# Patient Record
Sex: MALE | Race: WHITE | NOT HISPANIC OR LATINO | Employment: FULL TIME | URBAN - METROPOLITAN AREA
[De-identification: names, ages, dates, MRNs, and addresses within clinical notes are randomized per-mention and may not be internally consistent; named-entity substitution may affect disease eponyms.]

---

## 2018-03-31 ENCOUNTER — HOSPITAL ENCOUNTER (INPATIENT)
Facility: HOSPITAL | Age: 33
LOS: 1 days | Discharge: HOME | DRG: 343 | End: 2018-04-01
Attending: EMERGENCY MEDICINE | Admitting: SURGERY
Payer: COMMERCIAL

## 2018-03-31 DIAGNOSIS — K35.80 ACUTE APPENDICITIS: Primary | ICD-10-CM

## 2018-03-31 LAB
BASOPHILS # BLD MANUAL: 0 THOUSAND/UL (ref 0–0.1)
BASOPHILS NFR MAR MANUAL: 0 % (ref 0–1)
EOSINOPHIL # BLD MANUAL: 0 THOUSAND/UL (ref 0–0.4)
EOSINOPHIL NFR BLD MANUAL: 0 % (ref 0–6)
ERYTHROCYTE [DISTWIDTH] IN BLOOD BY AUTOMATED COUNT: 12.7 % (ref 11.6–15.1)
HCT VFR BLD AUTO: 44.6 % (ref 36.5–49.3)
HGB BLD-MCNC: 16.3 G/DL (ref 12–17)
LYMPHOCYTES # BLD AUTO: 0.8 THOUSAND/UL (ref 0.6–4.47)
LYMPHOCYTES # BLD AUTO: 5 % (ref 14–44)
MCH RBC QN AUTO: 31.7 PG (ref 26.8–34.3)
MCHC RBC AUTO-ENTMCNC: 36.5 G/DL (ref 31.4–37.4)
MCV RBC AUTO: 87 FL (ref 82–98)
MONOCYTES # BLD AUTO: 0.64 THOUSAND/UL (ref 0–1.22)
MONOCYTES NFR BLD: 4 % (ref 4–12)
NEUTROPHILS # BLD MANUAL: 14.6 THOUSAND/UL (ref 1.85–7.62)
NEUTS BAND NFR BLD MANUAL: 5 % (ref 0–8)
NEUTS SEG NFR BLD AUTO: 86 % (ref 43–75)
PLATELET # BLD AUTO: 183 THOUSANDS/UL (ref 149–390)
PLATELET BLD QL SMEAR: ADEQUATE
PMV BLD AUTO: 12.1 FL (ref 8.9–12.7)
RBC # BLD AUTO: 5.15 MILLION/UL (ref 3.88–5.62)
RBC MORPH BLD: NORMAL
TOTAL CELLS COUNTED SPEC: 100
WBC # BLD AUTO: 16.04 THOUSAND/UL (ref 4.31–10.16)

## 2018-03-31 PROCEDURE — 96375 TX/PRO/DX INJ NEW DRUG ADDON: CPT

## 2018-03-31 PROCEDURE — 80053 COMPREHEN METABOLIC PANEL: CPT | Performed by: EMERGENCY MEDICINE

## 2018-03-31 PROCEDURE — 96374 THER/PROPH/DIAG INJ IV PUSH: CPT

## 2018-03-31 PROCEDURE — 96361 HYDRATE IV INFUSION ADD-ON: CPT

## 2018-03-31 PROCEDURE — 36415 COLL VENOUS BLD VENIPUNCTURE: CPT | Performed by: EMERGENCY MEDICINE

## 2018-03-31 PROCEDURE — 85027 COMPLETE CBC AUTOMATED: CPT | Performed by: EMERGENCY MEDICINE

## 2018-03-31 PROCEDURE — 83690 ASSAY OF LIPASE: CPT | Performed by: EMERGENCY MEDICINE

## 2018-03-31 PROCEDURE — 85007 BL SMEAR W/DIFF WBC COUNT: CPT | Performed by: EMERGENCY MEDICINE

## 2018-03-31 RX ORDER — KETOROLAC TROMETHAMINE 30 MG/ML
30 INJECTION, SOLUTION INTRAMUSCULAR; INTRAVENOUS ONCE
Status: COMPLETED | OUTPATIENT
Start: 2018-03-31 | End: 2018-03-31

## 2018-03-31 RX ORDER — ONDANSETRON 2 MG/ML
4 INJECTION INTRAMUSCULAR; INTRAVENOUS ONCE
Status: COMPLETED | OUTPATIENT
Start: 2018-03-31 | End: 2018-03-31

## 2018-03-31 RX ADMIN — KETOROLAC TROMETHAMINE 30 MG: 30 INJECTION, SOLUTION INTRAMUSCULAR; INTRAVENOUS at 23:29

## 2018-03-31 RX ADMIN — SODIUM CHLORIDE 1000 ML: 0.9 INJECTION, SOLUTION INTRAVENOUS at 23:26

## 2018-03-31 RX ADMIN — ONDANSETRON HYDROCHLORIDE 4 MG: 2 INJECTION, SOLUTION INTRAMUSCULAR; INTRAVENOUS at 23:26

## 2018-04-01 ENCOUNTER — ANESTHESIA EVENT (INPATIENT)
Dept: PERIOP | Facility: HOSPITAL | Age: 33
DRG: 343 | End: 2018-04-01
Payer: COMMERCIAL

## 2018-04-01 ENCOUNTER — ANESTHESIA (INPATIENT)
Dept: PERIOP | Facility: HOSPITAL | Age: 33
DRG: 343 | End: 2018-04-01
Payer: COMMERCIAL

## 2018-04-01 ENCOUNTER — APPOINTMENT (EMERGENCY)
Dept: CT IMAGING | Facility: HOSPITAL | Age: 33
DRG: 343 | End: 2018-04-01
Payer: COMMERCIAL

## 2018-04-01 VITALS
HEART RATE: 87 BPM | DIASTOLIC BLOOD PRESSURE: 71 MMHG | RESPIRATION RATE: 18 BRPM | SYSTOLIC BLOOD PRESSURE: 133 MMHG | WEIGHT: 174.6 LBS | BODY MASS INDEX: 21.71 KG/M2 | OXYGEN SATURATION: 93 % | HEIGHT: 75 IN | TEMPERATURE: 98.4 F

## 2018-04-01 PROBLEM — K35.80 ACUTE APPENDICITIS: Status: ACTIVE | Noted: 2018-03-31

## 2018-04-01 PROBLEM — K37 APPENDICITIS: Status: ACTIVE | Noted: 2018-04-01

## 2018-04-01 LAB
ALBUMIN SERPL BCP-MCNC: 4.8 G/DL (ref 3.5–5)
ALP SERPL-CCNC: 75 U/L (ref 46–116)
ALT SERPL W P-5'-P-CCNC: 28 U/L (ref 12–78)
ANION GAP SERPL CALCULATED.3IONS-SCNC: 12 MMOL/L (ref 4–13)
AST SERPL W P-5'-P-CCNC: 10 U/L (ref 5–45)
BILIRUB SERPL-MCNC: 0.8 MG/DL (ref 0.2–1)
BUN SERPL-MCNC: 13 MG/DL (ref 5–25)
CALCIUM SERPL-MCNC: 9.4 MG/DL (ref 8.3–10.1)
CHLORIDE SERPL-SCNC: 101 MMOL/L (ref 100–108)
CO2 SERPL-SCNC: 28 MMOL/L (ref 21–32)
CREAT SERPL-MCNC: 0.89 MG/DL (ref 0.6–1.3)
GFR SERPL CREATININE-BSD FRML MDRD: 112 ML/MIN/1.73SQ M
GLUCOSE SERPL-MCNC: 131 MG/DL (ref 65–140)
LIPASE SERPL-CCNC: 139 U/L (ref 73–393)
POTASSIUM SERPL-SCNC: 3 MMOL/L (ref 3.5–5.3)
PROT SERPL-MCNC: 8.6 G/DL (ref 6.4–8.2)
SODIUM SERPL-SCNC: 141 MMOL/L (ref 136–145)

## 2018-04-01 PROCEDURE — 44970 LAPAROSCOPY APPENDECTOMY: CPT | Performed by: PHYSICIAN ASSISTANT

## 2018-04-01 PROCEDURE — 88304 TISSUE EXAM BY PATHOLOGIST: CPT | Performed by: PATHOLOGY

## 2018-04-01 PROCEDURE — 96365 THER/PROPH/DIAG IV INF INIT: CPT

## 2018-04-01 PROCEDURE — 44970 LAPAROSCOPY APPENDECTOMY: CPT | Performed by: SURGERY

## 2018-04-01 PROCEDURE — 99222 1ST HOSP IP/OBS MODERATE 55: CPT | Performed by: SURGERY

## 2018-04-01 PROCEDURE — 99285 EMERGENCY DEPT VISIT HI MDM: CPT

## 2018-04-01 PROCEDURE — 74177 CT ABD & PELVIS W/CONTRAST: CPT

## 2018-04-01 PROCEDURE — 0DTJ4ZZ RESECTION OF APPENDIX, PERCUTANEOUS ENDOSCOPIC APPROACH: ICD-10-PCS | Performed by: SURGERY

## 2018-04-01 RX ORDER — ROCURONIUM BROMIDE 10 MG/ML
INJECTION, SOLUTION INTRAVENOUS AS NEEDED
Status: DISCONTINUED | OUTPATIENT
Start: 2018-04-01 | End: 2018-04-01 | Stop reason: SURG

## 2018-04-01 RX ORDER — ROCURONIUM BROMIDE 10 MG/ML
INJECTION, SOLUTION INTRAVENOUS AS NEEDED
Status: DISCONTINUED | OUTPATIENT
Start: 2018-04-01 | End: 2018-04-01

## 2018-04-01 RX ORDER — LEVOFLOXACIN 5 MG/ML
500 INJECTION, SOLUTION INTRAVENOUS EVERY 24 HOURS
Status: DISCONTINUED | OUTPATIENT
Start: 2018-04-01 | End: 2018-04-01 | Stop reason: HOSPADM

## 2018-04-01 RX ORDER — FENTANYL CITRATE 50 UG/ML
INJECTION, SOLUTION INTRAMUSCULAR; INTRAVENOUS AS NEEDED
Status: DISCONTINUED | OUTPATIENT
Start: 2018-04-01 | End: 2018-04-01 | Stop reason: SURG

## 2018-04-01 RX ORDER — ACETAMINOPHEN 325 MG/1
650 TABLET ORAL EVERY 6 HOURS PRN
Status: DISCONTINUED | OUTPATIENT
Start: 2018-04-01 | End: 2018-04-01 | Stop reason: HOSPADM

## 2018-04-01 RX ORDER — ONDANSETRON 2 MG/ML
4 INJECTION INTRAMUSCULAR; INTRAVENOUS EVERY 4 HOURS PRN
Status: DISCONTINUED | OUTPATIENT
Start: 2018-04-01 | End: 2018-04-01 | Stop reason: HOSPADM

## 2018-04-01 RX ORDER — ONDANSETRON 4 MG/1
4 TABLET, ORALLY DISINTEGRATING ORAL EVERY 4 HOURS PRN
Status: DISCONTINUED | OUTPATIENT
Start: 2018-04-01 | End: 2018-04-01 | Stop reason: HOSPADM

## 2018-04-01 RX ORDER — FENTANYL CITRATE/PF 50 MCG/ML
25 SYRINGE (ML) INJECTION
Status: DISCONTINUED | OUTPATIENT
Start: 2018-04-01 | End: 2018-04-01 | Stop reason: HOSPADM

## 2018-04-01 RX ORDER — ONDANSETRON 2 MG/ML
4 INJECTION INTRAMUSCULAR; INTRAVENOUS ONCE AS NEEDED
Status: COMPLETED | OUTPATIENT
Start: 2018-04-01 | End: 2018-04-01

## 2018-04-01 RX ORDER — POTASSIUM CHLORIDE 14.9 MG/ML
20 INJECTION INTRAVENOUS ONCE
Status: COMPLETED | OUTPATIENT
Start: 2018-04-01 | End: 2018-04-01

## 2018-04-01 RX ORDER — MIDAZOLAM HYDROCHLORIDE 1 MG/ML
INJECTION INTRAMUSCULAR; INTRAVENOUS AS NEEDED
Status: DISCONTINUED | OUTPATIENT
Start: 2018-04-01 | End: 2018-04-01 | Stop reason: SURG

## 2018-04-01 RX ORDER — HYDROCODONE BITARTRATE AND ACETAMINOPHEN 5; 325 MG/1; MG/1
1 TABLET ORAL EVERY 4 HOURS PRN
Status: DISCONTINUED | OUTPATIENT
Start: 2018-04-01 | End: 2018-04-01 | Stop reason: HOSPADM

## 2018-04-01 RX ORDER — FENTANYL CITRATE 50 UG/ML
INJECTION, SOLUTION INTRAMUSCULAR; INTRAVENOUS AS NEEDED
Status: DISCONTINUED | OUTPATIENT
Start: 2018-04-01 | End: 2018-04-01

## 2018-04-01 RX ORDER — DEXTROSE AND SODIUM CHLORIDE 5; .45 G/100ML; G/100ML
80 INJECTION, SOLUTION INTRAVENOUS CONTINUOUS
Status: DISCONTINUED | OUTPATIENT
Start: 2018-04-01 | End: 2018-04-01 | Stop reason: HOSPADM

## 2018-04-01 RX ORDER — SODIUM CHLORIDE 9 MG/ML
125 INJECTION, SOLUTION INTRAVENOUS CONTINUOUS
Status: DISCONTINUED | OUTPATIENT
Start: 2018-04-01 | End: 2018-04-01 | Stop reason: HOSPADM

## 2018-04-01 RX ORDER — HYDROCODONE BITARTRATE AND ACETAMINOPHEN 5; 325 MG/1; MG/1
1-2 TABLET ORAL EVERY 6 HOURS PRN
Qty: 30 TABLET | Refills: 0 | Status: SHIPPED | OUTPATIENT
Start: 2018-04-01 | End: 2019-08-07 | Stop reason: ALTCHOICE

## 2018-04-01 RX ORDER — MORPHINE SULFATE 2 MG/ML
2 INJECTION, SOLUTION INTRAMUSCULAR; INTRAVENOUS EVERY 2 HOUR PRN
Status: DISCONTINUED | OUTPATIENT
Start: 2018-04-01 | End: 2018-04-01 | Stop reason: HOSPADM

## 2018-04-01 RX ORDER — SUCCINYLCHOLINE CHLORIDE 20 MG/ML
INJECTION INTRAMUSCULAR; INTRAVENOUS AS NEEDED
Status: DISCONTINUED | OUTPATIENT
Start: 2018-04-01 | End: 2018-04-01 | Stop reason: SURG

## 2018-04-01 RX ORDER — CLINDAMYCIN PHOSPHATE 600 MG/50ML
600 INJECTION INTRAVENOUS ONCE
Status: COMPLETED | OUTPATIENT
Start: 2018-04-01 | End: 2018-04-01

## 2018-04-01 RX ORDER — GLYCOPYRROLATE 0.2 MG/ML
INJECTION INTRAMUSCULAR; INTRAVENOUS AS NEEDED
Status: DISCONTINUED | OUTPATIENT
Start: 2018-04-01 | End: 2018-04-01 | Stop reason: SURG

## 2018-04-01 RX ORDER — MORPHINE SULFATE 4 MG/ML
4 INJECTION, SOLUTION INTRAMUSCULAR; INTRAVENOUS ONCE AS NEEDED
Status: COMPLETED | OUTPATIENT
Start: 2018-04-01 | End: 2018-04-01

## 2018-04-01 RX ORDER — PROPOFOL 10 MG/ML
INJECTION, EMULSION INTRAVENOUS AS NEEDED
Status: DISCONTINUED | OUTPATIENT
Start: 2018-04-01 | End: 2018-04-01 | Stop reason: SURG

## 2018-04-01 RX ADMIN — METRONIDAZOLE 500 MG: 500 INJECTION, SOLUTION INTRAVENOUS at 02:34

## 2018-04-01 RX ADMIN — HYDROCODONE BITARTRATE AND ACETAMINOPHEN 1 TABLET: 5; 325 TABLET ORAL at 14:03

## 2018-04-01 RX ADMIN — MORPHINE SULFATE 4 MG: 4 INJECTION, SOLUTION INTRAMUSCULAR; INTRAVENOUS at 02:19

## 2018-04-01 RX ADMIN — DEXAMETHASONE SODIUM PHOSPHATE 4 MG: 10 INJECTION INTRAMUSCULAR; INTRAVENOUS at 07:50

## 2018-04-01 RX ADMIN — NEOSTIGMINE METHYLSULFATE 3 MG: 1 INJECTION, SOLUTION INTRAMUSCULAR; INTRAVENOUS; SUBCUTANEOUS at 08:05

## 2018-04-01 RX ADMIN — MIDAZOLAM HYDROCHLORIDE 2 MG: 1 INJECTION, SOLUTION INTRAMUSCULAR; INTRAVENOUS at 07:26

## 2018-04-01 RX ADMIN — CLINDAMYCIN PHOSPHATE 600 MG: 12 INJECTION, SOLUTION INTRAMUSCULAR; INTRAVENOUS at 02:05

## 2018-04-01 RX ADMIN — SODIUM CHLORIDE: 0.9 INJECTION, SOLUTION INTRAVENOUS at 07:20

## 2018-04-01 RX ADMIN — ROCURONIUM BROMIDE 5 MG: 10 INJECTION INTRAVENOUS at 07:29

## 2018-04-01 RX ADMIN — SODIUM CHLORIDE 125 ML/HR: 0.9 INJECTION, SOLUTION INTRAVENOUS at 02:04

## 2018-04-01 RX ADMIN — DEXTROSE AND SODIUM CHLORIDE 80 ML/HR: 5; 450 INJECTION, SOLUTION INTRAVENOUS at 09:45

## 2018-04-01 RX ADMIN — SODIUM CHLORIDE: 0.9 INJECTION, SOLUTION INTRAVENOUS at 08:00

## 2018-04-01 RX ADMIN — Medication 500 MG: at 07:38

## 2018-04-01 RX ADMIN — ONDANSETRON 4 MG: 2 INJECTION INTRAMUSCULAR; INTRAVENOUS at 07:50

## 2018-04-01 RX ADMIN — PROPOFOL 180 MG: 10 INJECTION, EMULSION INTRAVENOUS at 07:30

## 2018-04-01 RX ADMIN — GLYCOPYRROLATE 0.4 MG: 0.2 INJECTION, SOLUTION INTRAMUSCULAR; INTRAVENOUS at 08:05

## 2018-04-01 RX ADMIN — ROCURONIUM BROMIDE 25 MG: 10 INJECTION INTRAVENOUS at 07:36

## 2018-04-01 RX ADMIN — IOHEXOL 90 ML: 350 INJECTION, SOLUTION INTRAVENOUS at 00:50

## 2018-04-01 RX ADMIN — POTASSIUM CHLORIDE 20 MEQ: 200 INJECTION, SOLUTION INTRAVENOUS at 00:50

## 2018-04-01 RX ADMIN — FENTANYL CITRATE 100 MCG: 50 INJECTION, SOLUTION INTRAMUSCULAR; INTRAVENOUS at 07:29

## 2018-04-01 RX ADMIN — SUCCINYLCHOLINE CHLORIDE 100 MG: 20 INJECTION, SOLUTION INTRAMUSCULAR; INTRAVENOUS; PARENTERAL at 07:30

## 2018-04-01 NOTE — ED PROVIDER NOTES
History  Chief Complaint   Patient presents with    Abdominal Pain     Presents with C/O mid abd pain and N/V x 4 hours  No fever, no diarrhea, ate "[place] food" yesterday  Vomiting x1  Last meal approx 1630  Patient complains of intense abdominal pain constant for about 7 hours now  Ate [place] food last night, felt ill today but severe pain around 5 pm   Vomited once and did not feel better afterwards  Associated nausea and chills  No diarrhea or constipation  Took Tums without relief  History provided by:  Patient  Abdominal Pain   Pain location:  Periumbilical  Pain radiates to:  Does not radiate  Pain severity:  Severe  Onset quality:  Gradual  Timing:  Constant  Progression:  Worsening  Chronicity:  New  Associated symptoms: chills, nausea and vomiting    Associated symptoms: no chest pain, no constipation, no diarrhea, no dysuria, no fever, no shortness of breath and no sore throat        None       History reviewed  No pertinent past medical history  History reviewed  No pertinent surgical history  History reviewed  No pertinent family history  I have reviewed and agree with the history as documented  Social History   Substance Use Topics    Smoking status: Never Smoker    Smokeless tobacco: Never Used    Alcohol use No        Review of Systems   Constitutional: Positive for chills  Negative for fever  HENT: Negative for rhinorrhea and sore throat  Respiratory: Negative for shortness of breath  Cardiovascular: Negative for chest pain  Gastrointestinal: Positive for abdominal pain, nausea and vomiting  Negative for constipation and diarrhea  Genitourinary: Negative for dysuria and frequency  Skin: Negative for rash  All other systems reviewed and are negative        Physical Exam  ED Triage Vitals [03/31/18 2306]   Temperature Pulse Respirations Blood Pressure SpO2   98 °F (36 7 °C) 62 20 129/66 100 %      Temp Source Heart Rate Source Patient Position - Orthostatic VS BP Location FiO2 (%)   Temporal Monitor Lying Right arm --      Pain Score       7           Orthostatic Vital Signs  Vitals:    04/01/18 0200 04/01/18 0215 04/01/18 0230 04/01/18 0315   BP: 146/78  143/81 125/68   Pulse:  80 87 93   Patient Position - Orthostatic VS: Lying  Lying Lying       Physical Exam   Constitutional: He is oriented to person, place, and time  He appears well-developed and well-nourished  HENT:   Mouth/Throat: Oropharynx is clear and moist    Eyes: Conjunctivae and EOM are normal  Pupils are equal, round, and reactive to light  Neck: Normal range of motion  Neck supple  No spinous process tenderness present  Cardiovascular: Normal rate, regular rhythm, normal heart sounds and intact distal pulses  Pulmonary/Chest: Effort normal and breath sounds normal  No respiratory distress  He has no wheezes  Abdominal: Soft  Bowel sounds are normal  He exhibits no distension  There is tenderness in the epigastric area  There is no rebound and no guarding  Musculoskeletal: Normal range of motion  Neurological: He is alert and oriented to person, place, and time  He has normal strength  No sensory deficit  GCS eye subscore is 4  GCS verbal subscore is 5  GCS motor subscore is 6  Skin: Skin is warm and dry  No rash noted  Psychiatric: He has a normal mood and affect  Nursing note and vitals reviewed        ED Medications  Medications   sodium chloride 0 9 % infusion (125 mL/hr Intravenous Continue to Inpatient Floor 4/1/18 0219)   ondansetron [facility]) injection 4 mg (not administered)   ketorolac (TORADOL) injection 30 mg (30 mg Intravenous Given 3/31/18 2329)   ondansetron (ZOFRAN) injection 4 mg (4 mg Intravenous Given 3/31/18 2326)   sodium chloride 0 9 % bolus 1,000 mL (0 mL Intravenous Stopped 4/1/18 0022)   potassium chloride 20 mEq IVPB (premix) (0 mEq Intravenous Stopped 4/1/18 0230)   iohexol (OMNIPAQUE) 350 MG/ML injection (SINGLE-DOSE) 90 mL (90 mL Intravenous Given 4/1/18 0050)   morphine (PF) 4 mg/mL injection 4 mg (4 mg Intravenous Given 4/1/18 0219)   clindamycin (CLEOCIN) IVPB (premix) 600 mg (0 mg Intravenous Stopped 4/1/18 0225)   metroNIDAZOLE (FLAGYL) IVPB (premix) 500 mg (0 mg Intravenous Stopped 4/1/18 0332)       Diagnostic Studies  Results Reviewed     Procedure Component Value Units Date/Time    CMP [[id number]]  (Abnormal) Collected:  03/31/18 2310    Lab Status:  Final result Specimen:  Blood from Line, Venous Updated:  04/01/18 0005     Sodium 141 mmol/L      Potassium 3 0 (L) mmol/L      Chloride 101 mmol/L      CO2 28 mmol/L      Anion Gap 12 mmol/L      BUN 13 mg/dL      Creatinine 0 89 mg/dL      Glucose 131 mg/dL      Calcium 9 4 mg/dL      AST 10 U/L      ALT 28 U/L      Alkaline Phosphatase 75 U/L      Total Protein 8 6 (H) g/dL      Albumin 4 8 g/dL      Total Bilirubin 0 80 mg/dL      eGFR 112 ml/min/1 73sq m     Narrative:         National Kidney Disease Education Program recommendations are as follows:  GFR calculation is accurate only with a steady state creatinine  Chronic Kidney disease less than 60 ml/min/1 73 sq  meters  Kidney failure less than 15 ml/min/1 73 sq  meters  Lipase [[id number]]  (Normal) Collected:  03/31/18 2310    Lab Status:  Final result Specimen:  Blood from Line, Venous Updated:  04/01/18 0005     Lipase 139 u/L     CBC and differential [[id number]]  (Abnormal) Collected:  03/31/18 2310    Lab Status:  Final result Specimen:  Blood from Line, Venous Updated:  03/31/18 2358     WBC 16 04 (H) Thousand/uL      RBC 5 15 Million/uL      Hemoglobin 16 3 g/dL      Hematocrit 44 6 %      MCV 87 fL      MCH 31 7 pg      MCHC 36 5 g/dL      RDW 12 7 %      MPV 12 1 fL      Platelets 912 Thousands/uL     Narrative: This is an appended report  These results have been appended to a previously verified report                   CT abdomen pelvis with contrast   ED Interpretation by Aguilar Henderson DO (04/01 0122)   Acute appendicitis read by virtual radiology 2120  Also, L5 pars defect reviewed with patient  Procedures  CriticalCare Time  Performed by: Lance Ewing  Authorized by: Lance Ewing     Critical care provider statement:     Critical care time (minutes):  30    Critical care time was exclusive of:  Separately billable procedures and treating other patients and teaching time    Critical care was time spent personally by me on the following activities:  Obtaining history from patient or surrogate, development of treatment plan with patient or surrogate, discussions with consultants, evaluation of patient's response to treatment, examination of patient, review of old charts, re-evaluation of patient's condition, ordering and review of radiographic studies, ordering and review of laboratory studies and ordering and performing treatments and interventions           Phone Contacts  ED Phone Contact    ED Course  ED Course as of Apr 01 0506   Sun Apr 01, 2018   0124 Paged Dr Carlene Agarwal                                MDM  Number of Diagnoses or Management Options  Acute appendicitis: new and requires workup     Amount and/or Complexity of Data Reviewed  Clinical lab tests: ordered and reviewed  Tests in the radiology section of CPT®: ordered and reviewed  Discuss the patient with other providers: yes    Patient Progress  Patient progress: improved    The patient presented with a condition in which there was a high probability of imminent or life-threatening deterioration, and critical care services (excluding separately billable procedures) totalled 30-74 minutes          Disposition  Final diagnoses:   Acute appendicitis     Time reflects when diagnosis was documented in both MDM as applicable and the Disposition within this note     Time User Action Codes Description Comment    4/1/2018  1:33 AM Vic Bernal Add [K33 80] Acute appendicitis       ED Disposition     ED Disposition Condition Comment    Admit  Case was discussed with **Zachery* and the patient's admission status was agreed to be Admission Status: inpatient status to the service of Dr Nicole Robert   Follow-up Information    None       There are no discharge medications for this patient  No discharge procedures on file      ED Provider  Electronically Signed by           Chloe Najjar,   04/01/18 Harleen 115, DO  04/01/18 7322

## 2018-04-01 NOTE — CASE MANAGEMENT
Initial Clinical Review    Admission: Date/Time/Statement: 4/1/18 @ 0134     Orders Placed This Encounter   Procedures    Inpatient Admission (expected length of stay for this patient is greater than two midnights)     Standing Status:   Standing     Number of Occurrences:   1     Order Specific Question:   Admitting Physician     Answer:   Indra Villa [[id number]]     Order Specific Question:   Level of Care     Answer:   Med Surg [[id number]]     Order Specific Question:   Estimated length of stay     Answer:   More than 2 Midnights     Order Specific Question:   Certification     Answer:   I certify that inpatient services are medically necessary for this patient for a duration of greater than two midnights  See H&P and MD Progress Notes for additional information about the patient's course of treatment  ED: Date/Time/Mode of Arrival:   ED Arrival Information     Expected Arrival Acuity Means of Arrival Escorted By Service Admission Type    - 3/31/2018 22:51 Urgent Walk-In Spouse Surgery-General Urgent    Arrival Complaint    ABDOMINAL PAIN          Chief Complaint:   Chief Complaint   Patient presents with    Abdominal Pain     Presents with C/O mid abd pain and N/V x 4 hours  No fever, no diarrhea, ate "[place] food" yesterday  Vomiting x1  Last meal approx 1630  History of Illness:    Frankie Thayer is a 35 y o  male who presents with many hours 4 hours of abdominal pain prior to admission with some vodka vomiting  No diarrhea  A [place] food the night before  No constipation  No bloody stools  Denies fevers or chills  CT scan shows mild acute appendicitis     He works as a labor        ED Vital Signs:   ED Triage Vitals [03/31/18 2306]   Temperature Pulse Respirations Blood Pressure SpO2   98 °F (36 7 °C) 62 20 129/66 100 %      Temp Source Heart Rate Source Patient Position - Orthostatic VS BP Location FiO2 (%)   Temporal Monitor Lying Right arm --      Pain Score       7        Wt Readings from Last 1 Encounters:   04/01/18 79 2 kg (174 lb 9 7 oz)       Vital Signs (abnormal):    above    Abnormal Labs/Diagnostic Test Results:    K  3 0  WBC   16 04  Ct  Abd/pelvis:     Findings concerning for uncomplicated acute appendicitis  2   Left basilar 5 mm pulmonary nodule  Based on current Fleischner Society 2017 Guidelines on incidental pulmonary nodule, no routine follow-up is needed if the patient is considered low risk for lung cancer   If the patient is considered high risk for   lung cancer, 12 month follow-up non-contrast chest CT is recommended      ED Treatment:   Medication Administration from 03/31/2018 2251 to 04/01/2018 0301       Date/Time Order Dose Route Action Action by Comments     03/31/2018 2329 ketorolac (TORADOL) injection 30 mg 30 mg Intravenous Given Mariza Rucker RN      03/31/2018 2326 ondansetron (ZOFRAN) injection 4 mg 4 mg Intravenous Given Mariza Rucker RN      04/01/2018 0022 sodium chloride 0 9 % bolus 1,000 mL 0 mL Intravenous Stopped Ambrose Da Silva RN      03/31/2018 2326 sodium chloride 0 9 % bolus 1,000 mL 1,000 mL Intravenous New Bag Mariza Rucker RN      04/01/2018 0230 potassium chloride 20 mEq IVPB (premix) 0 mEq Intravenous Stopped Mariza Rucker RN      04/01/2018 0050 potassium chloride 20 mEq IVPB (premix) 20 mEq Intravenous [address] Ambrose Da Silva RN      04/01/2018 0050 iohexol (OMNIPAQUE) 350 MG/ML injection (SINGLE-DOSE) 90 mL 90 mL Intravenous Given Beatriz Rose      04/01/2018 0219 sodium chloride 0 9 % infusion 125 mL/hr Intravenous Continue to Inpatient Floor Mariza Rucker RN 1 liter     04/01/2018 0204 sodium chloride 0 9 % infusion 125 mL/hr Intravenous New Bag Mariza Rucker RN      04/01/2018 2691 morphine (PF) 4 mg/mL injection 4 mg 4 mg Intravenous Given Mariza Rucker RN      04/01/2018 0225 clindamycin (CLEOCIN) IVPB (premix) 600 mg 0 mg Intravenous Stopped Mariza Rucker RN      04/01/2018 0205 clindamycin (CLEOCIN) IVPB (premix) 600 mg 600 mg Intravenous New Bag Alba Lujan RN      04/01/2018 0235 metroNIDAZOLE (FLAGYL) IVPB (premix) 500 mg 500 mg Intravenous Continue to Inpatient Floor Alba Lujan RN      04/01/2018 0234 metroNIDAZOLE (FLAGYL) IVPB (premix) 500 mg 500 mg Intravenous New Bag Alba Lujan RN           Past Medical/Surgical History: Active Ambulatory Problems     Diagnosis Date Noted    No Active Ambulatory Problems     Resolved Ambulatory Problems     Diagnosis Date Noted    No Resolved Ambulatory Problems     No Additional Past Medical History       Admitting Diagnosis: Abdominal pain [R10 9]  Acute appendicitis [K35 80]    Age/Sex: 35 y o  male    Assessment/Plan:   Acute appendicitis  For laparoscopic appendectomy possible open this morning  Informed consent reviewed with patient, wife and mother      OR   4/1  Operative Findings:  Acute suppurative appendicitis  Bulge at the base of the appendix , so tiny bit of cecum was taken with the appendix      Post operative diagnosis:   Post-Op Diagnosis Codes:     * Acute appendicitis [K35 80]     Procedure:   Procedure(s):  APPENDECTOMY LAPAROSCOPIC       Admission Orders:    IP      4/1  @   0135  Scheduled Meds:   Current Facility-Administered Medications:  acetaminophen 650 mg Oral Q6H PRN Dean Scott MD    dextrose 5 % and sodium chloride 0 45 % 80 mL/hr Intravenous Continuous Dean Scott MD Last Rate: 80 mL/hr (04/01/18 0945)   HYDROcodone-acetaminophen 1 tablet Oral Q4H PRN Dean Scott MD    HYDROmorphone 1 mg Intravenous Q3H PRN Dean Scott MD    levofloxacin 500 mg Intravenous Q24H Dean Scott MD    metroNIDAZOLE 500 mg Intravenous John Cervantes MD    morphine injection 2 mg Intravenous Q2H PRN Dean Scott MD    ondansetron 4 mg Intravenous Q4H PRTESSY Scott MD    ondansetron 4 mg Oral Q4H PRN Dean Scott MD    sodium chloride 125 mL/hr Intravenous Continuous Ashok Ghosh DO Last Rate: 125 mL/hr (04/01/18 3600)     Continuous Infusions:   dextrose 5 % and sodium chloride 0 45 % 80 mL/hr Last Rate: 80 mL/hr (04/01/18 2895)   sodium chloride 125 mL/hr Last Rate: 125 mL/hr (04/01/18 0070)     PRN Meds:   acetaminophen    HYDROcodone-acetaminophen    HYDROmorphone    morphine injection    ondansetron    ondansetron     Thank you,  [facility] in the [facility] by Zhang Rodriguez for 2017  Network Utilization Review Department  Phone: [phone]; Fax [phone]  ATTENTION: The Network Utilization Review Department is now centralized for our 7 Facilities  Make a note that we have a new phone and fax numbers for our Department  Please call with any questions or concerns to [phone] and carefully follow the prompts so that you are directed to the right person  All voicemails are confidential  Fax any determinations, approvals, denials, and requests for initial or continue stay review clinical to [phone]  Due to HIGH CALL volume, it would be easier if you could please send faxed requests to expedite your requests and in part, help us provide discharge notifications faster

## 2018-04-01 NOTE — ANESTHESIA PREPROCEDURE EVALUATION
Review of Systems/Medical History  Patient summary reviewed  Chart reviewed      Cardiovascular   Pulmonary       GI/Hepatic            Endo/Other     GYN       Hematology   Musculoskeletal       Neurology   Psychology           Physical Exam    Airway    Mallampati score: II  TM Distance: >3 FB  Neck ROM: full     Dental   No notable dental hx     Cardiovascular  Rhythm: regular, Rate: normal, Cardiovascular exam normal    Pulmonary  Pulmonary exam normal Breath sounds clear to auscultation,     Other Findings        Anesthesia Plan  ASA Score- 1     Anesthesia Type- general with ASA Monitors  Additional Monitors:   Airway Plan: ETT  Comment: Benefits and risks of planned anesthetic discussed with patient, and agrees to proceed  I, Dr Reina Lima, the attending anesthesiologist, have personally seen and evaluated the patient prior to anesthetic care  I have reviewed the preanesthetic record, and other medical records if appropriate to the anesthetic care  If a CRNA is involved in the case, I have reviewed the CRNA assessment, if present, and agree  The patient is in a suitable condition to proceed with my formulated anesthetic plan        Plan Factors-    Induction- intravenous  Postoperative Plan- Plan for postoperative opioid use  Informed Consent- Anesthetic plan and risks discussed with patient

## 2018-04-01 NOTE — DISCHARGE SUMMARY
Discharge Summary - Ed Ryan 35 y o  male MRN: [id number]    Unit/Bed#: [address] [place] [address] Encounter: [id number]      Pre-Operative Diagnosis: Pre-Op Diagnosis Codes:     * Acute appendicitis [K35 80]    Post-Operative Diagnosis: Post-Op Diagnosis Codes:     * Acute appendicitis [K35 80]    Procedures Performed:  Procedure(s):  APPENDECTOMY LAPAROSCOPIC    Surgeon: Lionel Hassan MD    See H & P for full details of admission and Operative Note for full details of operations performed  Patient was seen and examined prior to discharge  Provisions for Follow-Up Care:  See After Visit Summary for information related to follow-up care and home orders  Disposition: Home, in stable condition  Planned Readmission: No    Discharge Medications:  See after visit summary for reconciled discharge medications provided to patient and family  Post Operative instructions: Reviewed with patient and/or family  Some portions of this record may have been generated with voice recognition software  There may be translation, syntax,  or grammatical errors  Occasional wrong word or "sound-a-like" substitutions may have occurred due to the inherent limitations of the voice recognition software  Read the chart carefully and recognize, using context, where substitutions may have occurred  If you have any questions, please contact the dictating provider for clarification or correction, as needed  This encounter has been coded by non certified Coder      Signature:   Lionel Hassan MD  Date: 4/1/2018 Time: 2:27 PM

## 2018-04-01 NOTE — H&P
H&P Exam - Jayleen Sarmiento 35 y o  male MRN: [id number]    Unit/Bed#: [address] [place] [address] Encounter: [id number]    Assessment/Plan:  1  Acute appendicitis  For laparoscopic appendectomy possible open this morning  Informed consent reviewed with patient, wife and mother  Informed consent for procedure was personally discussed, reviewed, and signed by Dr Nahun Moncada  Discussion by Dr Nahun Moncada was carried out regarding risks, benefits, and alternatives with the patient  Risks include but are not limited to:  bleeding, infection, and delayed wound healing or an open wound, pulmonary embolus, leaks from bowel or bile ducts or other viscus, transfusions, death  Discussed in further detail the more common complications and their rates of occurrence   was used if necessary  Patient expressed understanding of the issues discussed and wished/consented to proceed  All questions were answered by Dr Nahun Moncada  Discussion performed between patient and the provider signing below  Signature:   Josh Monroe MD  Date: 4/1/2018 Time: 7:13 AM         2  VTE Prophylaxis   Pharmacologic:    Mechanical: sequential compression device    3  Expected Length of Stay   I expect this patient to stay less than  2 midnights for appendicitis  4  Code Status: full code  No Order    5  Discharge Planning      Josh Monroe MD  Date: 4/1/2018 Time: 7:13 AM       CC:  Acute appendicitis    HPI: Jayleen Sarmiento is a 35 y o  male who presents with many hours 4 hours of abdominal pain prior to admission with some vodka vomiting  No diarrhea  A [place] food the night before  No constipation  No bloody stools  Denies fevers or chills  CT scan shows mild acute appendicitis    He works as a labor            Historical Info:   Past Medical History:  History reviewed  No pertinent past medical history  Past Surgical History:  History reviewed  No pertinent surgical history      Social History:  History   Alcohol Use No     History   Drug Use No     History   Smoking Status    Never Smoker   Smokeless Tobacco    Never Used       Family History: non-contributory    Meds/Allergies   None     Allergies   Allergen Reactions    Penicillins        Objective     Vitals:  Vitals:    04/01/18 0200 04/01/18 0215 04/01/18 0230 04/01/18 0315   BP: 146/78  143/81 125/68   BP Location: Right arm  Right arm Right arm   Pulse:  80 87 93   Resp:  15 17 18   Temp: 98 5 °F (36 9 °C)   98 5 °F (36 9 °C)   TempSrc: Temporal   Oral   SpO2: 100% 99% 99% 97%   Weight:    79 2 kg (174 lb 9 7 oz)   Height:    6' 3" (1 905 m)       I/O       03/30 0701 - 03/31 0700 03/31 0701 - 04/01 0700 04/01 0701 - 04/02 0700    I V  (mL/kg)  1000 (12 6)     IV Piggyback  1000     Total Intake(mL/kg)  2000 (25 3)     Urine (mL/kg/hr)  550     Total Output   550      Net   +1450                   Invasive Devices     Peripheral Intravenous Line            Peripheral IV 03/31/18 Left Antecubital less than 1 day                    REVIEW OF SYSTEMS  General:   No Fever or chills; No significant weight loss or gain  EENT:   No ear pain, facial swelling; No sneezing, sore throat  Skin:   No rashes, color changes  Respiratory:     No shortness of breath, cough, wheezing, stridor  Cardiovascular:     No chest pain, palpitations  Gastrointestinal:    No nausea, vomiting, diarrhea; No abdominal pain except as described in HPI  Musculoskeletal:     No arthralgias, myalgias, swelling  Neurologic:   No dizziness, numbness, weakness  No speech difficulties     Psych:   No agitation, suicidal ideations    Otherwise, All other twelve-point review of systems normal          PHYSICAL EXAM  General Appearance:    Alert, cooperative, no distress,    Head:    Normocephalic without obvious abnormality   Eyes:    PERRL, conjunctiva/corneas clear, EOM's intact        Neck:   Supple, no adenopathy, no JVD   Back:     Symmetric, no spinal or CVA tenderness   Lungs:     Clear to auscultation bilaterally, no wheezing or rhonchi   Heart:    Regular rate and rhythm, S1 and S2 normal, no murmur   Abdomen:     Soft tender in the right lower quadrant  Extremities:   Extremities normal  No clubbing, cyanosis or edema   Psych:   Normal Affect   Neurologic:  Skin:   CNII-XII intact  Strength symmetric, speech intact    No significant changes or jaundice  Lab Results:     Results from last 7 days  Lab Units 03/31/18  2310   WBC Thousand/uL 16 04*   HEMOGLOBIN g/dL 16 3   HEMATOCRIT % 44 6   PLATELETS Thousands/uL 183   MONO PCT MAN % 4         Results from last 7 days  Lab Units 03/31/18  2310   SODIUM mmol/L 141   POTASSIUM mmol/L 3 0*   CHLORIDE mmol/L 101   CO2 mmol/L 28   BUN mg/dL 13   CREATININE mg/dL 0 89   CALCIUM mg/dL 9 4   TOTAL PROTEIN g/dL 8 6*   BILIRUBIN TOTAL mg/dL 0 80   ALK PHOS U/L 75   ALT U/L 28   AST U/L 10   GLUCOSE RANDOM mg/dL 131       Imaging: I personally reviewed the radiology studies, my impression is as follows:  CT scan shows appendicitis      Code Status: No Order    Tori Mina MD  Date: 4/1/2018 Time: 7:13 AM

## 2018-04-01 NOTE — OP NOTE
APPENDECTOMY LAPAROSCOPIC  Postoperative Note  PATIENT NAME: Kay Harris  : 1985  MRN: [id number]  [facility] OR ROOM 01    Surgery Date: 2018    Pre operative diagnosis:  Acute appendicitis [K35 80]    Operative Indications:  Acute Appendicitis       Consent:  The risks, benefits, and alternatives to the surgery were discussed with the patient and/or with the family prior to surgery, personally by Dr Matti Landa  If the consent was obtained by the physician assistant or other representative, the consent was reviewed once again personally by the operating physician  Common complications particular for this procedure as well as unusual complications were discussed, including but not limited to:  bleeding, wound infection, prolonged wound healing, open wounds, reoperation, leak from the bowel or viscus, leak from the bile duct or injury to adjacent or other organs or blood vessels in the abdomen  If the surgery was laparoscopic, it was discussed that possible open surgery could also occur during that same surgery and is always an option in laparoscopic surgery and possible reoperation  A  was used if necessary  The patient expressed understanding of the issues discussed and wished and consented to the procedure to proceed  All questions were answered  Dr Matti Landa personally discussed the informed consent with this patient  Operative Findings:  Acute suppurative appendicitis  Bulge at the base of the appendix , so tiny bit of cecum was taken with the appendix  Post operative diagnosis:   Post-Op Diagnosis Codes:     * Acute appendicitis [K35 80]    Procedure:   Procedure(s):  APPENDECTOMY LAPAROSCOPIC    Surgeon(s) and Role:     * Annette Bolton MD - Primary     * Neri Fair PA-C - Assisting    The Physician Assistant was medically necessary for surgical safety the case including suturing, retraction, and hemostasis  No qualified resident was available   I was present for the entire procedure  Drains:       Specimens:  ID Type Source Tests Collected by Time Destination   1 :  Tissue Appendix TISSUE EXAM Evan Sanchez MD 4/1/2018 5556        Estimated Blood Loss:   Minimal    Anesthesia Type:   Choice     Procedure: The patient was seen again in the Holding Room  The risks, benefits, complications, treatment options, and expected outcomes were discussed with the patient and/or family  The possibilities of reaction to medication, pulmonary aspiration, perforation of viscus, bleeding, recurrent infection, finding a normal appendix, the need for additional procedures, failure to diagnose a condition, and creating a complication requiring transfusion or operation were discussed  There was concurrence with the proposed plan and informed consent was obtained  The site of surgery was properly noted/marked  The patient was taken to Operating Room, identified as Kay Harris and the procedure verified as Appendectomy  A Time Out was held after the prep and draping,  and the above information confirmed  The patient was placed in the supine position and general anesthesia was induced, along with placement of orogastric tube, Venodyne boots, and a Gomez catheter  The abdomen was prepped and draped in a sterile fashion  An infraumbilical incision was made and an open technique was used to enter the abdomen  A port was placed and a pneumoperitoneum created  Additional ports were placed under direct vision as needed  A careful evaluation of the entire abdomen was carried out  The patient was placed in Trendelenburg and left lateral decubitus position  The small intestines were retracted in the cephalad and left lateral direction away from the pelvis and right lower quadrant  There was visualized an  inflamed appendix that was extending into the pelvis  There was no evidence of perforation  The appendix was carefully dissected   A window was made in the mesoappendix at the base of the appendix  Harmonic scapel and cautery were used to take the mesoappendix  The appendix was divided at its base using an endo-KULWINDER stapler, at the level of the cecum  There was no evidence of bleeding, leakage, or complication after division of the appendix  Irrigation was also performed and irrigate suctioned from the abdomen as well  The larger port site fascia was closed using a non-or minimally absorbable suture  All skin incisions were closed using MonocryI suture  The instrument, sponge, and needle counts were correct at the conclusion of the case  Some portions of this record may have been generated with voice recognition software  There may be translation, syntax,  or grammatical errors  Occasional wrong word or "sound-a-like" substitutions may have occurred due to the inherent limitations of the voice recognition software  Read the chart carefully and recognize, using context, where substations may have occurred  If you have any questions, please contact the dictating provider for clarification or correction, as needed       Complications: None    Condition: Stable to PACU    SIGNATURE: Ijeoma Scott MD   DATE: April 1, 2018   TIME: 8:14 AM

## 2018-04-01 NOTE — PROGRESS NOTES
Received back from pacu    Vss    mininmal abd discomfort, refused pain meds at this time    tochar sites intact to abd with ice at site    Assisted to BR and pt did void    IVF's continue   pt not hungry at this time ,taking sips of water

## 2018-04-01 NOTE — DISCHARGE INSTRUCTIONS
Marybeth Syed Instructions  Dr Sivakumar Rincon MD, FACS    1  General: You will feel pulling sensations around the wound or funny aches and pains around the incisions  This is normal  Even minor surgery is a change in your body and this is your bodys way of reaction to it  If you have had abdominal surgery, it may help to support the incision with a small pillow or blanket for comfort when moving or coughing  2  Wound care: Make sure to remove the bandage in about 24 hours, unless instructed otherwise  You usually don't have to redress the wound after 24-48 hours, unless for comfort  Keep the incision clean and dry  Let air get to it  If this Steri-Strips fall off, just keep the wound clean  3  Water: You may shower over the wound, unless there are drain tubes left in place  Do not bathe or use a pool or hot tub until cleared by the physician  You may shower right over the staples or Steri-Strips and packing dry when you are done  4  Activity: You may go up and down stairs, walk as much as you are comfortable, but walk at least 3 times each day  If you have had abdominal surgery, do not lift anything heavier than 15 pounds for at least 2-4 weeks, unless cleared by the doctor  5  Diet: You may resume a regular diet  If you had a same-day surgery or overnight stay surgery, you may wish to eat lightly for a few days: soups, crackers, and sandwiches  You may resume a regular diet when ready  6  Medications: Resume all of your previous medications, unless told otherwise by the doctor  Avoid aspirin or ibuprofen (Advil, Motrin, etc ) products for 2-3 days after the date of surgery  You may, at that time, began to take them again  Tylenol is always fine, unless you are taking any narcotic pain medication containing Tylenol (such as Percocet, Darvocet, Vicodin, or anything containing acetaminophen)  Do not take Tylenol if you're taking these medications   You do not need to take the narcotic pain medications unless you are having significant pain and discomfort  7  Driving: You will need someone to drive you home on the day of surgery  Do not drive or make any important decisions while on narcotic pain medication or 24 hours and after anesthesia or sedation for surgery  Generally, you may drive when your off all narcotic pain medications  8  Upset Stomach: You may take Maalox, Tums, or similar items for an upset stomach  If your narcotic pain medication causes an upset stomach, do not take it on an empty stomach  Try taking it with at least some crackers or toast      9  Constipation: Patients often experienced constipation after surgery  You may take over-the-counter medication for this, such as Metamucil, Senokot, Dulcolax, milk of magnesia, etc  You may take a suppository unless you have had anorectal surgery such as a procedure on your hemorrhoids  If you experience significant nausea or vomiting after abdominal surgery, call the office before trying any of these medications  10  Call the office: If you are experiencing any of the following, fevers above 101 5°, significant nausea or vomiting, if the wound develops drainage and/or is excessive redness around the wound, or if you have significant diarrhea or other worsening symptoms  11  Pain: You may be given a prescription for pain  This will be given to the hospital, the day of surgery  12  Sexual Activity: You may resume sexual activity when you feel ready and comfortable and your incision is sealed and healed without apparent infection risk  13  Urination: If you haven't urinated in 6 hours, go directly to the ER for evaluation for urinary retention       [address], [address]  [place], [address]   Phone: [phone]

## 2018-04-03 ENCOUNTER — TELEPHONE (OUTPATIENT)
Dept: SURGERY | Facility: CLINIC | Age: 33
End: 2018-04-03

## 2018-04-03 NOTE — TELEPHONE ENCOUNTER
Called pt post op of 4/1 appendectomy  Pt states he is doing well, ambulating ad dipti and po intake is fine  Incisions are clean and dry, no drainage noted  No N/V/D and moved bowels today  No c/o pain at this time  Post op appt set up for 4/9  Pathology of tissue pending and pt is aware  Advised to contact office w/ questions or concerns

## 2018-04-04 ENCOUNTER — TELEPHONE (OUTPATIENT)
Dept: SURGERY | Facility: CLINIC | Age: 33
End: 2018-04-04

## 2018-04-04 NOTE — TELEPHONE ENCOUNTER
Pt calling concerned that he has a small bruise noted on head of penis  Not painful and smaller then a warren  No problems urinating and no pain upon urination  No swelling noted as well at this time  Advised pt to monitor site and if any swelling or pain to go to ER for eval  Has post op appt here 4/9 and we can evaluate at that time if area remains unchanged  Pt did state he had a laurent catheter in place when he had his surgery on 4/1

## 2018-04-09 ENCOUNTER — OFFICE VISIT (OUTPATIENT)
Dept: SURGERY | Facility: CLINIC | Age: 33
End: 2018-04-09

## 2018-04-09 VITALS
BODY MASS INDEX: 21.01 KG/M2 | HEART RATE: 80 BPM | TEMPERATURE: 98.1 F | RESPIRATION RATE: 16 BRPM | SYSTOLIC BLOOD PRESSURE: 100 MMHG | DIASTOLIC BLOOD PRESSURE: 60 MMHG | WEIGHT: 169 LBS | HEIGHT: 75 IN

## 2018-04-09 DIAGNOSIS — Z48.89 ENCOUNTER FOR SURGICAL FOLLOW-UP CARE: Primary | ICD-10-CM

## 2018-04-09 PROCEDURE — 99024 POSTOP FOLLOW-UP VISIT: CPT | Performed by: SURGERY

## 2018-04-09 NOTE — PROGRESS NOTES
Assessment/Plan:   Shivam So is a 35 y o male who comes in today for postoperative check after laparoscopic appendectomy  on 4/1/18  Doing well with complaint of minor bruising  Pathology: Reviewed with patient, all questions answered  acute appendicitis      Postoperative restrictions reviewed  All questions answered  ______________________________________________________  HPI:  Shivam So is a 35 y o male who comes in today for postoperative check after recent  on   Currently doing well without problems, no fever or chills,no nausea and no vomiting  Reports some minor bruising  ROS:  General ROS: negative for - chills, fatigue, fever or night sweats, weight loss  Respiratory ROS: no cough, shortness of breath, or wheezing  Cardiovascular ROS: no chest pain or dyspnea on exertion  Genito-Urinary ROS: no dysuria, trouble voiding, or hematuria  Musculoskeletal ROS: negative for - gait disturbance, joint pain or muscle pain  Neurological ROS: no TIA or stroke symptoms  GI ROS: see HPI  Skin ROS: no new rashes or lesions   Lymphatic ROS: no new adenopathy noted by pt     GYN ROS: see HPI, no new GYN history or bleeding noted  Psy ROS: no new mental or behavioral disturbances         Patient Active Problem List   Diagnosis    Appendicitis    Acute appendicitis       Allergies:  Penicillins      Current Outpatient Prescriptions:     HYDROcodone-acetaminophen (NORCO) 5-325 mg per tablet, Take 1-2 tablets by mouth every 6 (six) hours as needed for pain for up to 30 doses Max Daily Amount: 8 tablets, Disp: 30 tablet, Rfl: 0    Past Medical History:   Diagnosis Date    No known problems        Past Surgical History:   Procedure Laterality Date    [place] LAP,APPENDECTOMY N/A 4/1/2018    Procedure: APPENDECTOMY LAPAROSCOPIC;  Surgeon: Cornelia Luz MD;  Location: [facility];  Service: General       Family History   Problem Relation Age of Onset    Diabetes Family     Prostate cancer Family         reports that he has never smoked  He has never used smokeless tobacco  He reports that he does not drink alcohol or use drugs  PHYSICAL EXAM  General: normal, cooperative, no distress  Abdominal: soft, nondistended or nontender  Incision: clean, dry, and intact and healing well    Some portions of this record may have been generated with voice recognition software  There may be translation, syntax,  or grammatical errors  Occasional wrong word or "sound-a-like" substitutions may have occurred due to the inherent limitations of the voice recognition software  Read the chart carefully and recognize, using context, where substitutions may have occurred  If you have any questions, please contact the dictating provider for clarification or correction, as needed  This encounter has been coded by a non-certified coder         Sivan Mendes MD    Date: 4/9/2018 Time: 8:20 AM

## 2018-04-09 NOTE — LETTER
April 9, 2018     Minus Suraj, [address]  [address]  [facility] [address]    Patient: Tanya Sheffield   YOB: 1985   Date of Visit: 4/9/2018       Dear Dr Tim Toussaint:    Thank you for referring Tanya Sheffiled to me for evaluation  Below are my notes for this consultation  If you have questions, please do not hesitate to call me  I look forward to following your patient along with you  Sincerely,        Xena Pollack MD        CC: No Recipients  Guillory Prost  4/9/2018  8:21 AM  Sign at close encounter  Assessment/Plan:   Tanya Sheffield is a 35 y o male who comes in today for postoperative check after laparoscopic appendectomy  on 4/1/18  Doing well with complaint of minor bruising  Pathology: Reviewed with patient, all questions answered  acute appendicitis      Postoperative restrictions reviewed  All questions answered  ______________________________________________________  HPI:  Tanya Sheffield is a 35 y o male who comes in today for postoperative check after recent  on   Currently doing well without problems, no fever or chills,no nausea and no vomiting  Reports some minor bruising  ROS:  General ROS: negative for - chills, fatigue, fever or night sweats, weight loss  Respiratory ROS: no cough, shortness of breath, or wheezing  Cardiovascular ROS: no chest pain or dyspnea on exertion  Genito-Urinary ROS: no dysuria, trouble voiding, or hematuria  Musculoskeletal ROS: negative for - gait disturbance, joint pain or muscle pain  Neurological ROS: no TIA or stroke symptoms  GI ROS: see HPI  Skin ROS: no new rashes or lesions   Lymphatic ROS: no new adenopathy noted by pt     GYN ROS: see HPI, no new GYN history or bleeding noted  Psy ROS: no new mental or behavioral disturbances         Patient Active Problem List   Diagnosis    Appendicitis    Acute appendicitis       Allergies:  Penicillins      Current Outpatient Prescriptions:    HYDROcodone-acetaminophen (NORCO) 5-325 mg per tablet, Take 1-2 tablets by mouth every 6 (six) hours as needed for pain for up to 30 doses Max Daily Amount: 8 tablets, Disp: 30 tablet, Rfl: 0    Past Medical History:   Diagnosis Date    No known problems        Past Surgical History:   Procedure Laterality Date    [place] LAP,APPENDECTOMY N/A 4/1/2018    Procedure: APPENDECTOMY LAPAROSCOPIC;  Surgeon: No Beaulieu MD;  Location:  [facility] OR;  Service: General       Family History   Problem Relation Age of Onset    Diabetes Family     Prostate cancer Family         reports that he has never smoked  He has never used smokeless tobacco  He reports that he does not drink alcohol or use drugs  PHYSICAL EXAM  General: normal, cooperative, no distress  Abdominal: soft, nondistended or nontender  Incision: clean, dry, and intact and healing well    Some portions of this record may have been generated with voice recognition software  There may be translation, syntax,  or grammatical errors  Occasional wrong word or "sound-a-like" substitutions may have occurred due to the inherent limitations of the voice recognition software  Read the chart carefully and recognize, using context, where substitutions may have occurred  If you have any questions, please contact the dictating provider for clarification or correction, as needed  This encounter has been coded by a non-certified coder         No Beaulieu MD    Date: 4/9/2018 Time: 8:20 AM

## 2018-04-09 NOTE — LETTER
April 9, 2018     Patient: Anni Sanchez   YOB: 1985   Date of Visit: 4/9/2018       To Whom it May Concern:    Anni Sanchez is under my professional care  He was seen in my office on 4/9/2018  He may return work on light duty lifting up to 25lbs for the next week  After this he may return to full duty  If you have any questions or concerns, please don't hesitate to call           Sincerely,          Nikole Beltrán MD        CC: Anni Avalosers

## 2019-07-31 LAB — HBA1C MFR BLD HPLC: 4.8 %

## 2019-08-07 ENCOUNTER — CONSULT (OUTPATIENT)
Dept: GASTROENTEROLOGY | Facility: CLINIC | Age: 34
End: 2019-08-07
Payer: COMMERCIAL

## 2019-08-07 VITALS
HEIGHT: 75 IN | SYSTOLIC BLOOD PRESSURE: 150 MMHG | DIASTOLIC BLOOD PRESSURE: 78 MMHG | BODY MASS INDEX: 20.39 KG/M2 | HEART RATE: 80 BPM | WEIGHT: 164 LBS

## 2019-08-07 DIAGNOSIS — R19.7 DIARRHEA, UNSPECIFIED TYPE: Primary | ICD-10-CM

## 2019-08-07 PROCEDURE — 99243 OFF/OP CNSLTJ NEW/EST LOW 30: CPT | Performed by: INTERNAL MEDICINE

## 2019-08-07 NOTE — LETTER
August 7, 2019     EDA Payne  [address]  [address])  [facility] [address]    Patient: Hubert Newton   YOB: 1985   Date of Visit: 8/7/2019       Dear Dr Breonna Raymond:    Thank you for referring Hubert Newton to me for evaluation  Below are my notes for this consultation  If you have questions, please do not hesitate to call me  I look forward to following your patient along with you  Sincerely,        Libra Davidson DO        CC: No Recipients  Libra Davidson DO  8/7/2019  5:28 PM  Addendum    [id number] - Outpatient Consultation  Hubert Newton 29 y o  male MRN: [id number]  Encounter: [id number]    ASSESSMENT AND PLAN:      1  Diarrhea, unspecified type  Present for nearly a year but with recent exacerbation  Initially had improvement with probiotics but symptoms recurred  Celiac panel was reportedly negative  Differential includes malabsorption, small intestinal bacterial overgrowth, microscopic colitis, IBS-D, or inflammatory bowel disease  Infectious etiology is less likely with a duration of symptoms  We discussed workup options and will start conservatively with stool studies  He will stop the probiotic  If symptoms persist and stool studies are negative will discuss colonoscopy  and breath testing for bacterial overgrowth  He will also pay attention to food triggers such as lactose or gluten  - Clostridium difficile toxin by PCR; Future  - Giardia antigen; Future  - Ova and parasite examination; Future  - Stool Enteric Bacterial Panel by PCR; Future  - Fecal fat, qualitative; Future  - Lactoferrin, fecal, quantitative; Future    ADDENDUM:  Prior labs obtained, normal thyroid panel, CBC, CMP, CRP and celiac panel  Food allergy testing was unremarkable    Total bilirubin was mildly elevated at 1 7    Followup Appointment[de-identified]  Pending stool studies  ______________________________________________________________________    Chief Complaint   Patient presents with    Diarrhea     Yellow, greasey, loose BMs; some mid abd pain; Sx worsening since spring    Referred by PCP       HPI:   Kevan Goodrich is a 29y o  year old male who presents for consultation regarding a change in bowel habits  Symptoms began last year but have been more pronounced over the past 6 months  He typically has 2-4 loose stools daily, often urgent  Denies nocturnal or postprandial symptoms  Stools often appear yellow or oily  He denies any specific food triggers  He has been working on [employer], minimizing fast food but there has been no change in symptoms  He started a probiotic with transient relief of symptoms for about a month, unfortunately they have recurred  A few weeks ago he had more frequent stools, up to 10 times a day for 2 or 3 days but symptoms resolved without intervention  Had celiac testing with his PCP that was negative, results are not yet available to me  He has occasional epigastric discomfort but no other upper GI complaints  He has a pet frog and noticed worms in the terrarium's water recently  He denies any antibiotics, unusual foods, foreign travel or other epidemiology for an infectious enteritis      Historical Information   Past Medical History:   Diagnosis Date    No known problems      Past Surgical History:   Procedure Laterality Date    [place] LAP,APPENDECTOMY N/A 4/1/2018    Procedure: APPENDECTOMY LAPAROSCOPIC;  Surgeon: Evelina Guerra MD;  Location: [facility] OR;  Service: General     Social History     Substance and Sexual Activity   Alcohol Use No     Social History     Substance and Sexual Activity   Drug Use No     Social History     Tobacco Use   Smoking Status Never Smoker   Smokeless Tobacco Never Used     Family History   Problem Relation Age of Onset    Diabetes Family     Prostate cancer Family     Colon polyps Family     Ulcers Brother        Meds/Allergies   No current outpatient medications on file     Allergies   Allergen Reactions    Penicillins        PHYSICAL EXAM:    Blood pressure 150/78, pulse 80, height 6' 3" (1 905 m), weight 74 4 kg (164 lb)  Body mass index is 20 5 kg/m²  General Appearance: NAD, cooperative, alert  HEENT:  Normocephalic, atraumatic, anicteric  Neck:  Supple, symmetrical, trachea midline  Lungs:  Clear to auscultation bilaterally; no rales, rhonchi or wheezing; respirations unlabored   Heart[de-identified]  Regular rate and rhythm; no murmur, rub, or gallop  Abdomen:  Soft, non-tender, non-distended; normal bowel sounds; no masses, no organomegaly   Rectal:  Deferred   Extremities: No cyanosis, clubbing or edema   Skin:  No jaundice, rashes, or lesions   Lymph nodes: No palpable cervical lymphadenopathy  Psych: Normal affect, good eye contact  Neuro: No gross deficits, AAOx3    Lab Results:   Lab Results   Component Value Date    WBC 16 04 (H) 03/31/2018    HGB 16 3 03/31/2018    HCT 44 6 03/31/2018    MCV 87 03/31/2018     03/31/2018     Lab Results   Component Value Date    K 3 0 (L) 03/31/2018     03/31/2018    CO2 28 03/31/2018    BUN 13 03/31/2018    CREATININE 0 89 03/31/2018    CALCIUM 9 4 03/31/2018    AST 10 03/31/2018    ALT 28 03/31/2018    ALKPHOS 75 03/31/2018    EGFR 112 03/31/2018     No results found for: IRON, TIBC, FERRITIN  Lab Results   Component Value Date    LIPASE 139 03/31/2018       Radiology Results:   No results found  REVIEW OF SYSTEMS:    CONSTITUTIONAL: Denies any fever, chills, rigors, and weight loss  HEENT: No earache or tinnitus  Denies hearing loss or visual disturbances  CARDIOVASCULAR: No chest pain or palpitations  RESPIRATORY: Denies any cough, hemoptysis, shortness of breath or dyspnea on exertion  GASTROINTESTINAL: As noted in the History of Present Illness  GENITOURINARY: No problems with urination  Denies any hematuria or dysuria  NEUROLOGIC: No dizziness or vertigo, denies headaches     MUSCULOSKELETAL: Denies any muscle or joint pain  SKIN: Denies skin rashes or itching  ENDOCRINE: Denies excessive thirst  Denies intolerance to heat or cold  PSYCHOSOCIAL: Denies depression or anxiety  Denies any recent memory loss

## 2019-08-07 NOTE — LETTER
August 7, 2019     EDA Zhou  [address]  ([address])  [facility] [address]    Patient: Roxy Ritter   YOB: 1985   Date of Visit: 8/7/2019       Dear Dr Monica Vargas:    Thank you for referring Roxy Ritter to me for evaluation  Below are my notes for this consultation  If you have questions, please do not hesitate to call me  I look forward to following your patient along with you  Sincerely,        Makenzie Ortega DO        CC: No Recipients  Makenzie Ortega DO  8/7/2019  5:27 PM  Sign at close encounter    [address] [facility] - [facility]  Roxy Ritter 29 y o  male MRN: [id number]  Encounter: [id number]    ASSESSMENT AND PLAN:      1  Diarrhea, unspecified type  Present for nearly a year but with recent exacerbation  Initially had improvement with probiotics but symptoms recurred  Celiac panel was reportedly negative  Differential includes malabsorption, small intestinal bacterial overgrowth, microscopic colitis, IBS-D, or inflammatory bowel disease  Infectious etiology is less likely with a duration of symptoms  We discussed workup options and will start conservatively with stool studies  He will stop the probiotic  If symptoms persist and stool studies are negative will discuss colonoscopy  and breath testing for bacterial overgrowth  He will also pay attention to food triggers such as lactose or gluten  - Clostridium difficile toxin by PCR; Future  - Giardia antigen; Future  - Ova and parasite examination; Future  - Stool Enteric Bacterial Panel by PCR; Future  - Fecal fat, qualitative; Future  - Lactoferrin, fecal, quantitative; Future        Followup Appointment[de-identified]  Pending stool studies  ______________________________________________________________________    Chief Complaint   Patient presents with    Diarrhea     Yellow, greasey, loose BMs; some mid abd pain;  Sx worsening since spring    Referred by PCP       HPI: Ed Ryan is a 29y o  year old male who presents for consultation regarding a change in bowel habits  Symptoms began last year but have been more pronounced over the past 6 months  He typically has 2-4 loose stools daily, often urgent  Denies nocturnal or postprandial symptoms  Stools often appear yellow or oily  He denies any specific food triggers  He has been working on [employer], minimizing fast food but there has been no change in symptoms  He started a probiotic with transient relief of symptoms for about a month, unfortunately they have recurred  A few weeks ago he had more frequent stools, up to 10 times a day for 2 or 3 days but symptoms resolved without intervention  Had celiac testing with his PCP that was negative, results are not yet available to me  He has occasional epigastric discomfort but no other upper GI complaints  He has a pet frog and noticed worms in the terrarium's water recently  He denies any antibiotics, unusual foods, foreign travel or other epidemiology for an infectious enteritis  Historical Information   Past Medical History:   Diagnosis Date    No known problems      Past Surgical History:   Procedure Laterality Date    [place] LAP,APPENDECTOMY N/A 4/1/2018    Procedure: APPENDECTOMY LAPAROSCOPIC;  Surgeon: Ayde Licona MD;  Location: [facility];  Service: General     Social History     Substance and Sexual Activity   Alcohol Use No     Social History     Substance and Sexual Activity   Drug Use No     Social History     Tobacco Use   Smoking Status Never Smoker   Smokeless Tobacco Never Used     Family History   Problem Relation Age of Onset    Diabetes Family     Prostate cancer Family     Colon polyps Family     Ulcers Brother        Meds/Allergies   No current outpatient medications on file  Allergies   Allergen Reactions    Penicillins        PHYSICAL EXAM:    Blood pressure 150/78, pulse 80, height 6' 3" (1 905 m), weight 74 4 kg (164 lb)  Body mass index is 20 5 kg/m²  General Appearance: NAD, cooperative, alert  HEENT:  Normocephalic, atraumatic, anicteric  Neck:  Supple, symmetrical, trachea midline  Lungs:  Clear to auscultation bilaterally; no rales, rhonchi or wheezing; respirations unlabored   Heart[de-identified]  Regular rate and rhythm; no murmur, rub, or gallop  Abdomen:  Soft, non-tender, non-distended; normal bowel sounds; no masses, no organomegaly   Rectal:  Deferred   Extremities: No cyanosis, clubbing or edema   Skin:  No jaundice, rashes, or lesions   Lymph nodes: No palpable cervical lymphadenopathy  Psych: Normal affect, good eye contact  Neuro: No gross deficits, AAOx3    Lab Results:   Lab Results   Component Value Date    WBC 16 04 (H) 03/31/2018    HGB 16 3 03/31/2018    HCT 44 6 03/31/2018    MCV 87 03/31/2018     03/31/2018     Lab Results   Component Value Date    K 3 0 (L) 03/31/2018     03/31/2018    CO2 28 03/31/2018    BUN 13 03/31/2018    CREATININE 0 89 03/31/2018    CALCIUM 9 4 03/31/2018    AST 10 03/31/2018    ALT 28 03/31/2018    ALKPHOS 75 03/31/2018    EGFR 112 03/31/2018     No results found for: IRON, TIBC, FERRITIN  Lab Results   Component Value Date    LIPASE 139 03/31/2018       Radiology Results:   No results found  REVIEW OF SYSTEMS:    CONSTITUTIONAL: Denies any fever, chills, rigors, and weight loss  HEENT: No earache or tinnitus  Denies hearing loss or visual disturbances  CARDIOVASCULAR: No chest pain or palpitations  RESPIRATORY: Denies any cough, hemoptysis, shortness of breath or dyspnea on exertion  GASTROINTESTINAL: As noted in the History of Present Illness  GENITOURINARY: No problems with urination  Denies any hematuria or dysuria  NEUROLOGIC: No dizziness or vertigo, denies headaches  MUSCULOSKELETAL: Denies any muscle or joint pain  SKIN: Denies skin rashes or itching  ENDOCRINE: Denies excessive thirst  Denies intolerance to heat or cold    PSYCHOSOCIAL: Denies depression or anxiety  Denies any recent memory loss

## 2019-08-07 NOTE — PROGRESS NOTES
[address] [facility] - Outpatient Consultation  Tod Boas 29 y o  male MRN: [id number]  Encounter: [id number]    ASSESSMENT AND PLAN:      1  Diarrhea, unspecified type  Present for nearly a year but with recent exacerbation  Initially had improvement with probiotics but symptoms recurred  Celiac panel was reportedly negative  Differential includes malabsorption, small intestinal bacterial overgrowth, microscopic colitis, IBS-D, or inflammatory bowel disease  Infectious etiology is less likely with a duration of symptoms  We discussed workup options and will start conservatively with stool studies  He will stop the probiotic  If symptoms persist and stool studies are negative will discuss colonoscopy  and breath testing for bacterial overgrowth  He will also pay attention to food triggers such as lactose or gluten  - Clostridium difficile toxin by PCR; Future  - Giardia antigen; Future  - Ova and parasite examination; Future  - Stool Enteric Bacterial Panel by PCR; Future  - Fecal fat, qualitative; Future  - Lactoferrin, fecal, quantitative; Future    ADDENDUM:  Prior labs obtained, normal thyroid panel, CBC, CMP, CRP and celiac panel  Food allergy testing was unremarkable  Total bilirubin was mildly elevated at 1 7    Followup Appointment[de-identified]  Pending stool studies  ______________________________________________________________________    Chief Complaint   Patient presents with    Diarrhea     Yellow, greasey, loose BMs; some mid abd pain; Sx worsening since spring    Referred by PCP       HPI:   Tod Boas is a 29y o  year old male who presents for consultation regarding a change in bowel habits  Symptoms began last year but have been more pronounced over the past 6 months  He typically has 2-4 loose stools daily, often urgent  Denies nocturnal or postprandial symptoms  Stools often appear yellow or oily  He denies any specific food triggers    He has been working on [employer], minimizing fast food but there has been no change in symptoms  He started a probiotic with transient relief of symptoms for about a month, unfortunately they have recurred  A few weeks ago he had more frequent stools, up to 10 times a day for 2 or 3 days but symptoms resolved without intervention  Had celiac testing with his PCP that was negative, results are not yet available to me  He has occasional epigastric discomfort but no other upper GI complaints  He has a pet frog and noticed worms in the terrarium's water recently  He denies any antibiotics, unusual foods, foreign travel or other epidemiology for an infectious enteritis  Historical Information   Past Medical History:   Diagnosis Date    No known problems      Past Surgical History:   Procedure Laterality Date    [place] LAP,APPENDECTOMY N/A 4/1/2018    Procedure: APPENDECTOMY LAPAROSCOPIC;  Surgeon: Gretchen Robles MD;  Location: [facility];  Service: General     Social History     Substance and Sexual Activity   Alcohol Use No     Social History     Substance and Sexual Activity   Drug Use No     Social History     Tobacco Use   Smoking Status Never Smoker   Smokeless Tobacco Never Used     Family History   Problem Relation Age of Onset    Diabetes Family     Prostate cancer Family     Colon polyps Family     Ulcers Brother        Meds/Allergies   No current outpatient medications on file  Allergies   Allergen Reactions    Penicillins        PHYSICAL EXAM:    Blood pressure 150/78, pulse 80, height 6' 3" (1 905 m), weight 74 4 kg (164 lb)  Body mass index is 20 5 kg/m²  General Appearance: NAD, cooperative, alert  HEENT:  Normocephalic, atraumatic, anicteric  Neck:  Supple, symmetrical, trachea midline  Lungs:  Clear to auscultation bilaterally; no rales, rhonchi or wheezing; respirations unlabored   Heart[de-identified]  Regular rate and rhythm; no murmur, rub, or gallop    Abdomen:  Soft, non-tender, non-distended; normal bowel sounds; no masses, no organomegaly   Rectal:  Deferred   Extremities: No cyanosis, clubbing or edema   Skin:  No jaundice, rashes, or lesions   Lymph nodes: No palpable cervical lymphadenopathy  Psych: Normal affect, good eye contact  Neuro: No gross deficits, AAOx3    Lab Results:   Lab Results   Component Value Date    WBC 16 04 (H) 03/31/2018    HGB 16 3 03/31/2018    HCT 44 6 03/31/2018    MCV 87 03/31/2018     03/31/2018     Lab Results   Component Value Date    K 3 0 (L) 03/31/2018     03/31/2018    CO2 28 03/31/2018    BUN 13 03/31/2018    CREATININE 0 89 03/31/2018    CALCIUM 9 4 03/31/2018    AST 10 03/31/2018    ALT 28 03/31/2018    ALKPHOS 75 03/31/2018    EGFR 112 03/31/2018     No results found for: IRON, TIBC, FERRITIN  Lab Results   Component Value Date    LIPASE 139 03/31/2018       Radiology Results:   No results found  REVIEW OF SYSTEMS:    CONSTITUTIONAL: Denies any fever, chills, rigors, and weight loss  HEENT: No earache or tinnitus  Denies hearing loss or visual disturbances  CARDIOVASCULAR: No chest pain or palpitations  RESPIRATORY: Denies any cough, hemoptysis, shortness of breath or dyspnea on exertion  GASTROINTESTINAL: As noted in the History of Present Illness  GENITOURINARY: No problems with urination  Denies any hematuria or dysuria  NEUROLOGIC: No dizziness or vertigo, denies headaches  MUSCULOSKELETAL: Denies any muscle or joint pain  SKIN: Denies skin rashes or itching  ENDOCRINE: Denies excessive thirst  Denies intolerance to heat or cold  PSYCHOSOCIAL: Denies depression or anxiety  Denies any recent memory loss

## 2019-09-06 LAB
C DIFF TOX GENS STL QL NAA+PROBE: NOT DETECTED
LACTOFERRIN STL-MCNC: <30 MCG/ML

## 2019-09-13 ENCOUNTER — TELEPHONE (OUTPATIENT)
Dept: GASTROENTEROLOGY | Facility: CLINIC | Age: 34
End: 2019-09-13

## 2019-09-13 NOTE — TELEPHONE ENCOUNTER
----- Message from Jerald Bear DO sent at 9/13/2019  5:26 PM EDT -----  Discussed with patient  Elevated fecal fat  Please arrange breath test to assess for small intestinal bacterial overgrowth

## 2019-11-22 ENCOUNTER — CLINICAL SUPPORT (OUTPATIENT)
Dept: GASTROENTEROLOGY | Facility: CLINIC | Age: 34
End: 2019-11-22
Payer: COMMERCIAL

## 2019-11-22 DIAGNOSIS — R19.7 DIARRHEA, UNSPECIFIED TYPE: Primary | ICD-10-CM

## 2019-11-22 DIAGNOSIS — K63.89 SMALL INTESTINAL BACTERIAL OVERGROWTH: ICD-10-CM

## 2019-11-22 PROCEDURE — 91065 BREATH HYDROGEN/METHANE TEST: CPT | Performed by: INTERNAL MEDICINE

## 2019-11-22 NOTE — PROGRESS NOTES
[address] [facility]  [address] [place] [address]   [phone]    Bacterial Overgrowth Analytical Record    Amisha Parsons 29 y o  male MRN: [id number]      Date of Test: 11/22/2019    Substrate Given: Lactulose    Ordering Provider: Dr Vesna Liu Assistant: All    Symptoms: diarrhea    The patient presents for bacterial overgrowth testing  Patient fasted overnight  Baseline readings obtained  substrate was administered, and the patient drink without difficulty  Breath test performed every 20 min for a total of 3 hr    Sample Clock Time ppmH2 ppmCH4 Co2% Arlette   Baseline   8:25 3 1 5 1 1 03   #1  20 minutes 8:48 10 1 4 4  1 20   #2  40 minutes 9:07 31 3 4 7 1 12   #3  60 minutes 9:27 48 4 4 4 1 20   #4  80 minutes 9:49 28 2 4 3 1 23   #5  100 minutes 10:09 25 2 4 3 1 23   #6  120 minutes 10:28 27 2 4 3 1 23   #7  140 minutes 10:50 42 4 4 2 1 26   #8  160 minutes 11:10 33 4 4 5 1 17   #9  180 minutes 11:28 40 3 3 7 1 43       Physician interpretation:  Positive study  Discussed with patient  Allergic to penicillin (believes he had a rash as a child)  Will treat with Bactrim/Flagyl  Requested call back if symptoms fail to improve

## 2019-12-03 RX ORDER — SULFAMETHOXAZOLE AND TRIMETHOPRIM 800; 160 MG/1; MG/1
1 TABLET ORAL 2 TIMES DAILY
Qty: 20 TABLET | Refills: 0 | Status: SHIPPED | OUTPATIENT
Start: 2019-12-03 | End: 2019-12-13

## 2019-12-03 RX ORDER — METRONIDAZOLE 500 MG/1
500 TABLET ORAL 3 TIMES DAILY
Qty: 30 TABLET | Refills: 0 | Status: SHIPPED | OUTPATIENT
Start: 2019-12-03 | End: 2019-12-13

## 2020-10-08 ENCOUNTER — TELEPHONE (OUTPATIENT)
Dept: GASTROENTEROLOGY | Facility: CLINIC | Age: 35
End: 2020-10-08

## 2020-10-16 ENCOUNTER — OFFICE VISIT (OUTPATIENT)
Dept: GASTROENTEROLOGY | Facility: CLINIC | Age: 35
End: 2020-10-16
Payer: COMMERCIAL

## 2020-10-16 VITALS — WEIGHT: 172 LBS | BODY MASS INDEX: 20.95 KG/M2 | HEIGHT: 76 IN

## 2020-10-16 DIAGNOSIS — R10.9 ABDOMINAL PAIN, UNSPECIFIED ABDOMINAL LOCATION: ICD-10-CM

## 2020-10-16 DIAGNOSIS — R19.4 CHANGE IN BOWEL HABITS: ICD-10-CM

## 2020-10-16 DIAGNOSIS — R19.7 DIARRHEA, UNSPECIFIED TYPE: ICD-10-CM

## 2020-10-16 DIAGNOSIS — K63.89 SMALL INTESTINAL BACTERIAL OVERGROWTH: Primary | ICD-10-CM

## 2020-10-16 PROCEDURE — 99213 OFFICE O/P EST LOW 20 MIN: CPT | Performed by: NURSE PRACTITIONER

## 2020-10-16 RX ORDER — SULFAMETHOXAZOLE AND TRIMETHOPRIM 800; 160 MG/1; MG/1
1 TABLET ORAL 2 TIMES DAILY
Qty: 20 TABLET | Refills: 0 | Status: SHIPPED | OUTPATIENT
Start: 2020-10-16 | End: 2020-10-26

## 2020-10-16 RX ORDER — DICYCLOMINE HYDROCHLORIDE 10 MG/1
10 CAPSULE ORAL EVERY 6 HOURS PRN
Qty: 90 CAPSULE | Refills: 1 | Status: SHIPPED | OUTPATIENT
Start: 2020-10-16

## 2020-10-16 RX ORDER — METRONIDAZOLE 500 MG/1
500 TABLET ORAL 3 TIMES DAILY
Qty: 30 TABLET | Refills: 0 | Status: SHIPPED | OUTPATIENT
Start: 2020-10-16 | End: 2020-10-26

## 2020-10-19 ENCOUNTER — TELEPHONE (OUTPATIENT)
Dept: GASTROENTEROLOGY | Facility: CLINIC | Age: 35
End: 2020-10-19

## 2020-12-10 LAB
ALBUMIN SERPL-MCNC: 5 G/DL (ref 3.6–5.1)
ALBUMIN/GLOB SERPL: 2.1 (CALC) (ref 1–2.5)
ALP SERPL-CCNC: 56 U/L (ref 36–130)
ALT SERPL-CCNC: 18 U/L (ref 9–46)
AST SERPL-CCNC: 13 U/L (ref 10–40)
BASOPHILS # BLD AUTO: 21 CELLS/UL (ref 0–200)
BASOPHILS NFR BLD AUTO: 0.3 %
BILIRUB SERPL-MCNC: 0.9 MG/DL (ref 0.2–1.2)
BUN SERPL-MCNC: 15 MG/DL (ref 7–25)
BUN/CREAT SERPL: ABNORMAL (CALC) (ref 6–22)
CALCIUM SERPL-MCNC: 9.7 MG/DL (ref 8.6–10.3)
CHLORIDE SERPL-SCNC: 103 MMOL/L (ref 98–110)
CO2 SERPL-SCNC: 29 MMOL/L (ref 20–32)
CREAT SERPL-MCNC: 0.83 MG/DL (ref 0.6–1.35)
CRP SERPL-MCNC: 1 MG/L
EOSINOPHIL # BLD AUTO: 43 CELLS/UL (ref 15–500)
EOSINOPHIL NFR BLD AUTO: 0.6 %
ERYTHROCYTE [DISTWIDTH] IN BLOOD BY AUTOMATED COUNT: 11.8 % (ref 11–15)
ERYTHROCYTE [SEDIMENTATION RATE] IN BLOOD BY WESTERGREN METHOD: 2 MM/H
GLOBULIN SER CALC-MCNC: 2.4 G/DL (CALC) (ref 1.9–3.7)
GLUCOSE SERPL-MCNC: 67 MG/DL (ref 65–139)
HCT VFR BLD AUTO: 45.1 % (ref 38.5–50)
HGB BLD-MCNC: 15.8 G/DL (ref 13.2–17.1)
LYMPHOCYTES # BLD AUTO: 1512 CELLS/UL (ref 850–3900)
LYMPHOCYTES NFR BLD AUTO: 21.3 %
MCH RBC QN AUTO: 31.7 PG (ref 27–33)
MCHC RBC AUTO-ENTMCNC: 35 G/DL (ref 32–36)
MCV RBC AUTO: 90.6 FL (ref 80–100)
MONOCYTES # BLD AUTO: 540 CELLS/UL (ref 200–950)
MONOCYTES NFR BLD AUTO: 7.6 %
NEUTROPHILS # BLD AUTO: 4984 CELLS/UL (ref 1500–7800)
NEUTROPHILS NFR BLD AUTO: 70.2 %
PLATELET # BLD AUTO: 156 THOUSAND/UL (ref 140–400)
PMV BLD REES-ECKER: 12.9 FL (ref 7.5–12.5)
POTASSIUM SERPL-SCNC: 3.4 MMOL/L (ref 3.5–5.3)
PROT SERPL-MCNC: 7.4 G/DL (ref 6.1–8.1)
RBC # BLD AUTO: 4.98 MILLION/UL (ref 4.2–5.8)
SL AMB EGFR AFRICAN AMERICAN: 132 ML/MIN/1.73M2
SL AMB EGFR NON AFRICAN AMERICAN: 114 ML/MIN/1.73M2
SODIUM SERPL-SCNC: 141 MMOL/L (ref 135–146)
TSH SERPL-ACNC: 1.05 MIU/L (ref 0.4–4.5)
WBC # BLD AUTO: 7.1 THOUSAND/UL (ref 3.8–10.8)

## 2021-01-27 ENCOUNTER — OFFICE VISIT (OUTPATIENT)
Dept: GASTROENTEROLOGY | Facility: CLINIC | Age: 36
End: 2021-01-27
Payer: COMMERCIAL

## 2021-01-27 VITALS
WEIGHT: 164 LBS | HEART RATE: 61 BPM | HEIGHT: 76 IN | DIASTOLIC BLOOD PRESSURE: 78 MMHG | SYSTOLIC BLOOD PRESSURE: 116 MMHG | BODY MASS INDEX: 19.97 KG/M2

## 2021-01-27 DIAGNOSIS — R19.7 DIARRHEA, UNSPECIFIED TYPE: ICD-10-CM

## 2021-01-27 DIAGNOSIS — R10.30 LOWER ABDOMINAL PAIN: ICD-10-CM

## 2021-01-27 DIAGNOSIS — R19.4 CHANGE IN BOWEL HABIT: Primary | ICD-10-CM

## 2021-01-27 DIAGNOSIS — K63.89 SMALL INTESTINAL BACTERIAL OVERGROWTH: ICD-10-CM

## 2021-01-27 PROBLEM — K63.8219 SMALL INTESTINAL BACTERIAL OVERGROWTH: Status: ACTIVE | Noted: 2021-01-27

## 2021-01-27 PROCEDURE — 99213 OFFICE O/P EST LOW 20 MIN: CPT | Performed by: NURSE PRACTITIONER

## 2021-01-27 NOTE — PROGRESS NOTES
[facility] - Outpatient Follow-up Note  Lola Anderson 28 y o  male MRN: [id number]  Encounter: [id number]    ASSESSMENT AND PLAN:      1  Change in bowel habit  2  Diarrhea, unspecified type  3  Lower abdominal pain  Intermittent, random episodes of 10+ urgent, watery, nonbloody stools daily with occasional fecal incontinence and nocturnal stools occurring over the past 2 years  Associated with continuous abdominal cramping from umbilicus to lower abdomen with intermittent sharp twinges  Initial episode lasted 8 months prior to GI consultation  Previous minor stool variation, some abdominal cramping with anxiety or dietary indiscretions, likely IBS  Previous celiac panel was self reported as negative, 2019  Stool studies exclude infectious etiology  Fecal lactoferrin was normal   Recent ESR, CRP, CBC, CMP, TSH all normal   Fecal fat was abnormal   Breath test confirmed SIBO  Diarrhea subsided temporarily after initial and subsequent SIBO treatments for several months then returned  Cannot exclude microscopic colitis, IBD  -will schedule colonoscopy-BM EC as discussed with Dr Xuan Mazariegos  -may continue to use dicyclomine for recurrent abdominal pain/cramping p r n     4  Small intestinal bacterial overgrowth  Confirmed with breath test in August, 2019  Treated with Bactrim and Flagyl  Re-treated with same in October, 2020  Diarrhea resolved after each treatment (initially returned after 1 year)  Followup Appointment:  4 weeks  ______________________________________________________________________    Chief Complaint   Patient presents with    Follow up to labs     HPI:  61-year-old male patient returns to the office to follow up recent lab work and review symptoms following retreatment of SIBO  Reports that usual bowel pattern is 1-2 formed stools daily  Stools could become loose with anxiety, stress, dietary indiscretions including gluten, possibly dairy  Occasional bloating if he overeats otherwise none  Two years ago he began with sudden onset of 10+ urgent, watery, nonbloody stools daily associated with continuous lower abdominal cramping, occasional sharp twinges  Notes nocturnal stooling and fecal incontinence with these episodes  Denies fevers or chills, nausea or vomiting, UGI or alarm symptoms, melena, hematochezia  Initial symptoms persisted for 8 months until he finally sought evaluation at our GI office  Initial testing noted abnormal fecal fat and + SIBO  He was treated with Bactrim and Flagyl August, 2019 with returned to normal bowel pattern  Symptoms resumed in the fall for several weeks  He was seen in the office October, 2020  Stopped taking probiotic, was re-treated for SIBO with Bactrim, Flagyl and currently endorses normal bowel pattern  He cannot associate these flares of diarrhea with any food triggers, anxiety or stress  No dietary changes or medications to explain  He initially lost 10-12 lb with the 1st event in 2019 (175 lb to 164 lb)  Weight is currently stable    Appetite is normal     Historical Information   Past Medical History:   Diagnosis Date    Small intestinal bacterial overgrowth      Past Surgical History:   Procedure Laterality Date    APPENDECTOMY      [place] LAP,APPENDECTOMY N/A 4/1/2018    Procedure: Marybeth Sanchez;  Surgeon: Sergio Bay MD;  Location: [facility] OR;  Service: General     Social History     Substance and Sexual Activity   Alcohol Use No    Frequency: Never    Binge frequency: Never    Comment:  no alcohol for at least 1 year ( January, 2021)     Social History     Substance and Sexual Activity   Drug Use No     Social History     Tobacco Use   Smoking Status Never Smoker   Smokeless Tobacco Never Used     Family History   Problem Relation Age of Onset    Diabetes Family     Prostate cancer Family     Colon polyps Family     Ulcers Brother     Colon cancer Neg Hx Current Outpatient Medications:     dicyclomine (BENTYL) 10 mg capsule  Allergies   Allergen Reactions    Penicillins Other (See Comments)      Happened as an infant so patient does not recall reaction     Reviewed medications and allergies and updated as indicated    ROS:  All systems were reviewed and positives noted as per HPI  All other systems were reported as negative  PHYSICAL EXAM:    Blood pressure 116/78, pulse 61, height 6' 4" (1 93 m), weight 74 4 kg (164 lb)  Body mass index is 19 96 kg/m²  General Appearance: NAD, cooperative, alert  Head:  Normocephalic, atraumatic  Eyes: Anicteric, PERRLA, EOMI  ENT:  Normal external appearance, normal mucosa  Neck:  Supple, symmetrical, trachea midline  Resp:  Clear to auscultation bilaterally; no rales, rhonchi or wheezing; respirations unlabored   CV:  S1 S2, Regular rate and rhythm; no murmur, rub, or gallop  GI:  Soft, non-tender, non-distended; normal bowel sounds; no masses, no organomegaly   Rectal: Deferred  Musculoskeletal: No cyanosis, clubbing or edema  Normal ROM  Skin:  No jaundice, rashes, or lesions   Heme/Lymph: No palpable cervical lymphadenopathy  Psych: Normal affect, good eye contact  Neuro: No gross deficits, AAOx3    Lab Results:  Reviewed lab results from December, 2020  Lab Results   Component Value Date    WBC 7 1 12/09/2020    HGB 15 8 12/09/2020    HCT 45 1 12/09/2020    MCV 90 6 12/09/2020     12/09/2020     Lab Results   Component Value Date    K 3 4 (L) 12/09/2020     12/09/2020    CO2 29 12/09/2020    BUN 15 12/09/2020    CREATININE 0 83 12/09/2020    CALCIUM 9 7 12/09/2020    AST 13 12/09/2020    ALT 18 12/09/2020    ALKPHOS 56 12/09/2020    EGFR 112 03/31/2018     No results found for: IRON, TIBC, FERRITIN  Lab Results   Component Value Date    LIPASE 139 03/31/2018       Radiology Results:   No results found

## 2021-02-17 VITALS — WEIGHT: 170 LBS | BODY MASS INDEX: 21.14 KG/M2 | HEIGHT: 75 IN

## 2021-02-17 DIAGNOSIS — R19.7 DIARRHEA, UNSPECIFIED TYPE: Primary | ICD-10-CM

## 2021-02-17 RX ORDER — SODIUM PICOSULFATE, MAGNESIUM OXIDE, AND ANHYDROUS CITRIC ACID 10; 3.5; 12 MG/160ML; G/160ML; G/160ML
LIQUID ORAL
Qty: 1 BOTTLE | Refills: 0 | Status: SHIPPED | OUTPATIENT
Start: 2021-02-17 | End: 2021-02-24 | Stop reason: HOSPADM

## 2021-02-17 NOTE — TELEPHONE ENCOUNTER
Why does your doctor want you to have this procedure? Change in bowels    Do you have kidney disease?  no  If yes, are you on dialysis :     Have you had diverticulitis within the past 2 months? no    Are you diabetic?  no  If yes, insulin dependent: If yes, provide diabetic instructions sheet     Do take iron supplements?  no  If yes, instruct patient to hold iron supplement for 7 days prior    Are you on a blood thinner? no   Was the blood thinner sheet complete and faxed to cardiologist no  Plavix (clopidogrel), Coumadin (warfarin), Lovenox (enoxaparin), Xarelto (rivaroxaban), Pradaxa(dabigatran), Eliquis(apixaban) Savaysa/Lixiana (edoxapan)    Do you have an automatic implantable cardiac defibrillator (AICD)/pacemaker ([facility])? no  Was AICD/pacemaker sheet completed and faxed to cardiologist? no    Are you on home oxygen? no  If yes, continuous or nocturnal:     Have you been treated for MRSA, VRE or any communicable diseases? no    Heart attack, stroke, or stent within 3 months? no  Schedule at Hospital if within 3-6 months   Use nitroglycerin for chest pain in the last 6 months? no    History of organ  transplant?  no   If yes, notify Endo      History of neck/throat/tongue surgery or cancer? no  IF yes, notify Endo      Any problems with anesthesia in the past? no     Was stool C diff ordered?  no Stool specimen needs to be completed prior to procedure    Do have any facial or body piercings?no     Do you have a latex allergy? no     Do have an allergy to metals? (Bravo study only) no     If pediatric patient, was consent faxed to pediatrician no     Patient rights reviewed yes    Rx Clenpiq sent to provider for signature  Instructions emailed to patient

## 2021-02-24 ENCOUNTER — HOSPITAL ENCOUNTER (OUTPATIENT)
Dept: GASTROENTEROLOGY | Facility: AMBULATORY SURGERY CENTER | Age: 36
Discharge: HOME | End: 2021-02-24
Payer: COMMERCIAL

## 2021-02-24 ENCOUNTER — ANESTHESIA EVENT (OUTPATIENT)
Dept: GASTROENTEROLOGY | Facility: AMBULATORY SURGERY CENTER | Age: 36
End: 2021-02-24

## 2021-02-24 ENCOUNTER — ANESTHESIA (OUTPATIENT)
Dept: GASTROENTEROLOGY | Facility: AMBULATORY SURGERY CENTER | Age: 36
End: 2021-02-24

## 2021-02-24 VITALS
SYSTOLIC BLOOD PRESSURE: 124 MMHG | DIASTOLIC BLOOD PRESSURE: 68 MMHG | RESPIRATION RATE: 22 BRPM | OXYGEN SATURATION: 100 % | HEART RATE: 58 BPM | TEMPERATURE: 98.6 F

## 2021-02-24 VITALS — HEART RATE: 69 BPM

## 2021-02-24 DIAGNOSIS — R19.7 DIARRHEA, UNSPECIFIED TYPE: ICD-10-CM

## 2021-02-24 DIAGNOSIS — R19.4 CHANGE IN BOWEL HABIT: ICD-10-CM

## 2021-02-24 DIAGNOSIS — R10.30 LOWER ABDOMINAL PAIN: ICD-10-CM

## 2021-02-24 PROCEDURE — 88305 TISSUE EXAM BY PATHOLOGIST: CPT | Performed by: PATHOLOGY

## 2021-02-24 PROCEDURE — 45380 COLONOSCOPY AND BIOPSY: CPT | Performed by: INTERNAL MEDICINE

## 2021-02-24 RX ORDER — PROPOFOL 10 MG/ML
INJECTION, EMULSION INTRAVENOUS AS NEEDED
Status: DISCONTINUED | OUTPATIENT
Start: 2021-02-24 | End: 2021-02-24

## 2021-02-24 RX ORDER — SODIUM CHLORIDE 9 MG/ML
50 INJECTION, SOLUTION INTRAVENOUS CONTINUOUS
Status: DISCONTINUED | OUTPATIENT
Start: 2021-02-24 | End: 2021-02-28 | Stop reason: HOSPADM

## 2021-02-24 RX ADMIN — PROPOFOL 100 MG: 10 INJECTION, EMULSION INTRAVENOUS at 08:02

## 2021-02-24 RX ADMIN — SODIUM CHLORIDE 50 ML/HR: 9 INJECTION, SOLUTION INTRAVENOUS at 07:46

## 2021-02-24 RX ADMIN — PROPOFOL 200 MG: 10 INJECTION, EMULSION INTRAVENOUS at 07:58

## 2021-02-24 RX ADMIN — PROPOFOL 50 MG: 10 INJECTION, EMULSION INTRAVENOUS at 08:07

## 2021-02-24 NOTE — ANESTHESIA PREPROCEDURE EVALUATION
Procedure:  COLONOSCOPY    Relevant Problems   No relevant active problems        Physical Exam    Airway    Mallampati score: II  TM Distance: >3 FB  Neck ROM: full     Dental   No notable dental hx     Cardiovascular  Rhythm: regular, Rate: normal, Cardiovascular exam normal    Pulmonary  Pulmonary exam normal Breath sounds clear to auscultation,     Other Findings        Anesthesia Plan  ASA Score- 1     Anesthesia Type- IV sedation with anesthesia with ASA Monitors  Additional Monitors:   Airway Plan:           Plan Factors-    Chart reviewed  Patient is not a current smoker  Induction- intravenous  Postoperative Plan-     Informed Consent- Anesthetic plan and risks discussed with patient

## 2021-03-08 NOTE — RESULT ENCOUNTER NOTE
Left voicemail, screening colonoscopy at age 48  Has office visit in late March, this could be canceled if symptoms remain well controlled

## 2021-03-25 ENCOUNTER — OFFICE VISIT (OUTPATIENT)
Dept: GASTROENTEROLOGY | Facility: CLINIC | Age: 36
End: 2021-03-25
Payer: COMMERCIAL

## 2021-03-25 VITALS
DIASTOLIC BLOOD PRESSURE: 84 MMHG | BODY MASS INDEX: 20.7 KG/M2 | HEART RATE: 90 BPM | SYSTOLIC BLOOD PRESSURE: 128 MMHG | HEIGHT: 76 IN | WEIGHT: 170 LBS

## 2021-03-25 DIAGNOSIS — K63.89 SMALL INTESTINAL BACTERIAL OVERGROWTH: ICD-10-CM

## 2021-03-25 DIAGNOSIS — R19.7 DIARRHEA, UNSPECIFIED TYPE: Primary | ICD-10-CM

## 2021-03-25 PROCEDURE — 99213 OFFICE O/P EST LOW 20 MIN: CPT | Performed by: INTERNAL MEDICINE

## 2021-03-25 NOTE — PROGRESS NOTES
[facility] - Outpatient Follow-up Note  Casi Parra 39 y o  male MRN: [id number]  Encounter: [id number]    ASSESSMENT AND PLAN:      1  Small intestinal bacterial overgrowth  2  Diarrhea, unspecified type   initially diagnosed with bacterial overgrowth in 2019,  A brief course of antibiotics provided relief for about a year  Recurrent symptoms in October 2020, took about a month for symptoms to resolve  No alternate etiology on colonoscopy  I asked patient to pay attention to food triggers  Provided information on a low FODMAP diet  I also recommended taking Lactaid before dairy  It if symptoms worsen significantly he will contact me for repeat antibiotics  He was concerned about vitamin malabsorption from oily stools, routine labs set  - Vitamin D 25 hydroxy  - Vitamin B12  - Folate  - Iron, TIBC and Ferritin Panel      Followup Appointment:  As needed  ______________________________________________________________________    Chief Complaint   Patient presents with    Follow-up colonoscopy     HPI:   The patient returns for follow-up on bacterial overgrowth  He was last seen the time of a colonoscopy in February that was negative for IBD and microscopic colitis  Stools are generally well controlled with normal stools most days  A few times weekly have to looser stools in the morning with no specific food triggers  Occasionally have a stool later in the day  He is trying to cut back on sugar and bread  He denies any upper GI complaints  He denies any rectal bleeding  It initial onset of stool symptoms stools were oily but this is no longer the case    He  reports lack of energy and some hair loss so he is concerned about vitamin and mineral depletion    Historical Information   Past Medical History:   Diagnosis Date    Small intestinal bacterial overgrowth      Past Surgical History:   Procedure Laterality Date    APPENDECTOMY      COLONOSCOPY 02/24/2021    Negative for microscopic colitis    VT LAP,APPENDECTOMY N/A 4/1/2018    Procedure: APPENDECTOMY LAPAROSCOPIC;  Surgeon: Opal Gibbs MD;  Location:  MAIN OR;  Service: General     Social History     Substance and Sexual Activity   Alcohol Use No    Frequency: Never    Binge frequency: Never    Comment:  no alcohol for at least 1 year ( January, 2021)     Social History     Substance and Sexual Activity   Drug Use No     Social History     Tobacco Use   Smoking Status Never Smoker   Smokeless Tobacco Never Used     Family History   Problem Relation Age of Onset    Diabetes Family     Prostate cancer Family     Colon polyps Family     Ulcers Brother     Colon cancer Neg Hx          Current Outpatient Medications:     dicyclomine (BENTYL) 10 mg capsule  Allergies   Allergen Reactions    Penicillins Other (See Comments)      Happened as an infant so patient does not recall reaction     Reviewed medications and allergies and updated as indicated    PHYSICAL EXAM:    Blood pressure 128/84, pulse 90, height 6' 4" (1 93 m), weight 77 1 kg (170 lb)  Body mass index is 20 69 kg/m²  General Appearance: NAD, cooperative, alert  Eyes: Anicteric, PERRLA, EOMI  ENT:  Normocephalic, atraumatic, normal mucosa  Neck:  Supple, symmetrical, trachea midline  Resp:  Clear to auscultation bilaterally; no rales, rhonchi or wheezing; respirations unlabored   CV:  S1 S2, Regular rate and rhythm; no murmur, rub, or gallop  GI:  Soft, non-tender, non-distended; normal bowel sounds; no masses, no organomegaly   Rectal: Deferred  Musculoskeletal: No cyanosis, clubbing or edema  Normal ROM    Skin:  No jaundice, rashes, or lesions   Heme/Lymph: No palpable cervical lymphadenopathy  Psych: Normal affect, good eye contact  Neuro: No gross deficits, AAOx3    Lab Results:   Lab Results   Component Value Date    WBC 7 1 12/09/2020    HGB 15 8 12/09/2020    HCT 45 1 12/09/2020    MCV 90 6 12/09/2020     12/09/2020     Lab Results   Component Value Date    K 3 4 (L) 12/09/2020     12/09/2020    CO2 29 12/09/2020    BUN 15 12/09/2020    CREATININE 0 83 12/09/2020    CALCIUM 9 7 12/09/2020    AST 13 12/09/2020    ALT 18 12/09/2020    ALKPHOS 56 12/09/2020    EGFR 112 03/31/2018     No results found for: IRON, TIBC, FERRITIN  Lab Results   Component Value Date    LIPASE 139 03/31/2018       Radiology Results:   No results found

## 2021-03-25 NOTE — PATIENT INSTRUCTIONS
Read up on the low FODMAP diet [facility])     consider Lactaid pills before dairy     obtain labs at [address]     if symptoms flare, contact this office to discuss antibiotics

## 2023-03-08 ENCOUNTER — TELEPHONE (OUTPATIENT)
Dept: GASTROENTEROLOGY | Facility: CLINIC | Age: 38
End: 2023-03-08

## 2023-03-08 DIAGNOSIS — K63.89 SMALL INTESTINAL BACTERIAL OVERGROWTH (SIBO): Primary | ICD-10-CM

## 2023-03-08 RX ORDER — METRONIDAZOLE 500 MG/1
500 TABLET ORAL 3 TIMES DAILY
Qty: 30 TABLET | Refills: 0 | Status: SHIPPED | OUTPATIENT
Start: 2023-03-08 | End: 2023-03-18

## 2023-03-08 RX ORDER — SULFAMETHOXAZOLE AND TRIMETHOPRIM 800; 160 MG/1; MG/1
1 TABLET ORAL 2 TIMES DAILY
Qty: 20 TABLET | Refills: 0 | Status: SHIPPED | OUTPATIENT
Start: 2023-03-08 | End: 2023-03-18

## 2023-03-08 NOTE — TELEPHONE ENCOUNTER
Patients GI provider:  Dr Rica Avalos    Number to return call: [phone]    Reason for call: Pt calling because he thinks he is having issues with his SIBO  He has abelardo having gas for a few months and dark diarrhea for the past week  He has some fever on Saturday   He scheduled an appointment but would like to know if he can have some medicine until he can get in to be seen    Scheduled procedure/appointment date if applicable: Appt 2/9/21

## 2023-03-08 NOTE — TELEPHONE ENCOUNTER
Patient has been experiencing smelling flatus for approximately 1 month  Now with 2-week history of loose stools and intermittent diarrhea  Symptoms are similar to previous SIBO  Will retreat with Bactrim and Flagyl due to penicillin allergy  Recommend he take probiotics such as align after he has been treated with antibiotics  Also recommend avoiding dairy, concentrated sweets  Patient previously followed low FODMAP diet and admits to recent dietary indiscretion  Okay to take Pepto-Bismol as needed    Patient scheduled for office visit in May and encouraged him to call the office if he continues to experience symptoms after treatment

## 2023-03-08 NOTE — TELEPHONE ENCOUNTER
I spoke with patient, diarrhea has lightened up in color  He did take a dose of Pepto Bismol which could explain darkened stool  No blood noted  He is still very gassy and some nausea  Patient has scheduled future appointment  He has been treated with Bactrim DS and Flagyl in the past with good results  [facility]

## 2023-10-18 ENCOUNTER — OFFICE VISIT (OUTPATIENT)
Dept: GASTROENTEROLOGY | Facility: CLINIC | Age: 38
End: 2023-10-18
Payer: COMMERCIAL

## 2023-10-18 VITALS
WEIGHT: 178 LBS | BODY MASS INDEX: 22.13 KG/M2 | HEIGHT: 75 IN | SYSTOLIC BLOOD PRESSURE: 118 MMHG | DIASTOLIC BLOOD PRESSURE: 60 MMHG

## 2023-10-18 DIAGNOSIS — K59.00 CONSTIPATION, UNSPECIFIED CONSTIPATION TYPE: Primary | ICD-10-CM

## 2023-10-18 DIAGNOSIS — Z98.890 HX OF COLONOSCOPY: ICD-10-CM

## 2023-10-18 DIAGNOSIS — L29.0 ANAL ITCHING: ICD-10-CM

## 2023-10-18 PROCEDURE — 99213 OFFICE O/P EST LOW 20 MIN: CPT | Performed by: NURSE PRACTITIONER

## 2023-10-18 NOTE — PROGRESS NOTES
[facility] - Outpatient Follow-up Note  Sher Toure 45 y.o. male MRN: [id number]  Encounter: [id number]    ASSESSMENT AND PLAN:      1. Constipation, unspecified constipation type  Patient states that he is having periods of constipation with occasional episodes of loose bowel movements. Patient is a sure of fiber intake. Discussed adequate fiber at approximately 20 to 25 g/day also adequate fluids. May supplement with Metamucil or Benefiber. Discussed MiraLAX as needed on fifth day of no bowel movement. Consider chronic versus functional constipation. Patient is reason and would like to be conservative with treatment and reevaluate with follow-up office visit.    -20 to 25 g fiber per day, adequate fluids  -Patient education material attached to discharge summary for fiber intake. -MiraLAX, no bowel movement by third day, 1/2-1 capful of MiraLAX daily until BM. 2. Anal itching  Patient states he has noticed increased anal itching. He states it feels deep at times. No hemorrhoids identified on previous colonoscopy. Discussed with patient trial use over-the-counter Preparation H either ointment or suppository or both. Can we have eluate with follow-up office visit or through [address] [place] [address] if symptoms worsen or persist.    3. Hx of colonoscopy  Colonoscopy 2021; recall colonoscopy at age 39      Followup Appointment: 3 months  ______________________________________________________________________    Chief Complaint   Patient presents with    Diarrhea     Has loose stools, not diarrhea, goes 2-3x a day, sometimes it is quick to come on, after he feels an itchy feeling     HPI: 66-year-old male with past medical history of SIBO and diarrhea presents for office visit for loose bowel movements, urgency with bowel movements and anal itching after bowel movements. On exam, patient denies nausea, vomiting or abdominal pain. States he is not having any acid reflux symptoms. States his weight is stable. States he is having constipation with episodes of loose bowel movements. Denies hematochezia or melena. Non-smoker, denies EtOH use. Patient did have colonoscopy on 2/2021 which was normal and biopsies were negative. Historical Information   Past Medical History:   Diagnosis Date    Small intestinal bacterial overgrowth      Past Surgical History:   Procedure Laterality Date    APPENDECTOMY      COLONOSCOPY  02/24/2021    Negative for microscopic colitis    KNEE SURGERY Left     VT LAPAROSCOPIC APPENDECTOMY N/A 04/01/2018    Procedure: APPENDECTOMY LAPAROSCOPIC;  Surgeon: Oliva Fowler MD;  Location: [facility] OR;  Service: General     Social History     Substance and Sexual Activity   Alcohol Use No    Comment:  no alcohol for at least 1 year ( January, 2021)     Social History     Substance and Sexual Activity   Drug Use No     Social History     Tobacco Use   Smoking Status Never   Smokeless Tobacco Never     Family History   Problem Relation Age of Onset    Diabetes Family     Prostate cancer Family     Colon polyps Family     Ulcers Brother     Colon cancer Neg Hx          Current Outpatient Medications:     dicyclomine (BENTYL) 10 mg capsule  Allergies   Allergen Reactions    Penicillins Other (See Comments)      Happened as an infant so patient does not recall reaction     Reviewed medications and allergies and updated as indicated    PHYSICAL EXAM:    Blood pressure 118/60, height 6' 3" (1.905 m), weight 80.7 kg (178 lb). Body mass index is 22.25 kg/m². Normal exam    General Appearance: NAD, cooperative, alert  Eyes: Anicteric, PERRLA, EOMI  ENT:  Normocephalic, atraumatic, normal mucosa. Neck:  Supple, symmetrical, trachea midline  Resp:  Clear to auscultation bilaterally; no rales, rhonchi or wheezing; respirations unlabored   CV:  S1 S2, Regular rate and rhythm; no murmur, rub, or gallop.   GI:  Soft, non-tender, non-distended; normal bowel sounds; no masses, no organomegaly   Rectal: Deferred  Musculoskeletal: No cyanosis, clubbing or edema. Normal ROM. Skin:  No jaundice, rashes, or lesions   Heme/Lymph: No palpable cervical lymphadenopathy  Psych: Normal affect, good eye contact  Neuro: No gross deficits, AAOx3    Lab Results:   Lab Results   Component Value Date    WBC 7.1 12/09/2020    HGB 15.8 12/09/2020    HCT 45.1 12/09/2020    MCV 90.6 12/09/2020     12/09/2020     Lab Results   Component Value Date    K 3.4 (L) 12/09/2020     12/09/2020    CO2 29 12/09/2020    BUN 15 12/09/2020    CREATININE 0.83 12/09/2020    CALCIUM 9.7 12/09/2020    AST 13 12/09/2020    ALT 18 12/09/2020    ALKPHOS 56 12/09/2020    EGFR 112 03/31/2018     No results found for: "IRON", "TIBC", "FERRITIN"  Lab Results   Component Value Date    LIPASE 139 03/31/2018       Radiology Results:   No results found.

## 2024-07-11 ENCOUNTER — OFFICE VISIT (OUTPATIENT)
Dept: GASTROENTEROLOGY | Facility: CLINIC | Age: 39
End: 2024-07-11
Payer: COMMERCIAL

## 2024-07-11 VITALS
HEIGHT: 75 IN | BODY MASS INDEX: 21.36 KG/M2 | SYSTOLIC BLOOD PRESSURE: 116 MMHG | WEIGHT: 171.8 LBS | DIASTOLIC BLOOD PRESSURE: 72 MMHG

## 2024-07-11 DIAGNOSIS — R19.7 DIARRHEA, UNSPECIFIED TYPE: Primary | ICD-10-CM

## 2024-07-11 DIAGNOSIS — K63.8219 SMALL INTESTINAL BACTERIAL OVERGROWTH: ICD-10-CM

## 2024-07-11 DIAGNOSIS — R14.0 BLOATING: ICD-10-CM

## 2024-07-11 PROCEDURE — 99213 OFFICE O/P EST LOW 20 MIN: CPT | Performed by: STUDENT IN AN ORGANIZED HEALTH CARE EDUCATION/TRAINING PROGRAM

## 2024-07-11 NOTE — PROGRESS NOTES
"[facility] - Outpatient Follow-up Note  Lanre Castro 39 y.o. male MRN: [id number]  Encounter: [id number]    ASSESSMENT AND PLAN:      1. Diarrhea, unspecified type  Possible self-limited episode from possible GI viral illness or food poisoning although he did not eat anything out of the ordinary.  He feels better but is on a restricted diet.  We will order a breath test to rule out SIBO given similar symptoms in the past associated with this.  If positive we will proceed with antibiotics.  - Small intestinal bacterial overgrowth    2. Bloating  As noted above, acute GI illness received to be getting better although is on a restrictive diet.  Will rule out SIBO.  If negative will see back in follow-up and consider further testing based on symptoms.  - Small intestinal bacterial overgrowth    3. Small intestinal bacterial overgrowth  Follow-up breath test result        Follow up appointment: 3 to 4 months    _______________________      Chief Complaint   Patient presents with    GI Consult     Pt states his bowel movements have been irregular, stool is thin, occasional diarrhea and bloating. He has eliminated bread and sugar. Pt would like to do a breath test.       HPI:   Lanre is a pleasant 39-year-old male with past medical history of chronic GI ailments including alternating constipation, diarrhea, prior SIBO who presents after recent episode of diarrhea followed by food intolerance and concern for recurrent SIBO.  He reports he was doing well at about 2 weeks ago when he had a sudden episode of \"explosive \"diarrhea and was \"out of commission\" for the rest of the evening.  Since then he has been sensitive to foods including carbohydrates and has been eating mainly a protein diet.  He feels like he is getting improved but is concerned that he has recurrence of SIBO.  He is not noticing any blood in his stool, no nausea or vomiting, fevers or chills.  His last colonoscopy " "was in 2021 and advised to recall at age 45.    Historical Information   Past Medical History:   Diagnosis Date    Small intestinal bacterial overgrowth      Past Surgical History:   Procedure Laterality Date    APPENDECTOMY      COLONOSCOPY  02/24/2021    Negative for microscopic colitis    KNEE SURGERY Left     AK LAPAROSCOPIC APPENDECTOMY N/A 04/01/2018    Procedure: APPENDECTOMY LAPAROSCOPIC;  Surgeon: Idania Bingham MD;  Location: [facility] OR;  Service: General     Social History     Substance and Sexual Activity   Alcohol Use No    Comment:  no alcohol for at least 1 year ( January, 2021)     Social History     Substance and Sexual Activity   Drug Use No     Social History     Tobacco Use   Smoking Status Never    Passive exposure: Never   Smokeless Tobacco Never     Family History   Problem Relation Age of Onset    Diabetes Family     Prostate cancer Family     Colon polyps Family     Ulcers Brother     Colon cancer Neg Hx          Current Outpatient Medications:     dicyclomine (BENTYL) 10 mg capsule  Allergies   Allergen Reactions    Penicillins Other (See Comments)     Happened as an infant so patient does not recall reaction     Reviewed medications and allergies and updated as indicated    PHYSICAL EXAM:    Blood pressure 116/72, height 6' 3\" (1.905 m), weight 77.9 kg (171 lb 12.8 oz). Body mass index is 21.47 kg/m².  General Appearance: NAD, cooperative, alert  Eyes: Anicteric  GI:  Soft, non-tender, non-distended; normal bowel sounds; no masses, no organomegaly   Rectal: Deferred  Musculoskeletal: No edema.  Skin:  No jaundice    Lab Results:   Lab Results   Component Value Date    WBC 7.1 12/09/2020    WBC 16.04 (H) 03/31/2018    HGB 15.8 12/09/2020    HGB 16.3 03/31/2018    MCV 90.6 12/09/2020     12/09/2020     03/31/2018     Lab Results   Component Value Date    K 3.4 (L) 12/09/2020     12/09/2020    CO2 29 12/09/2020    BUN 15 12/09/2020    CREATININE 0.83 12/09/2020    " "CALCIUM 9.7 12/09/2020    AST 13 12/09/2020    AST 10 03/31/2018    ALT 18 12/09/2020    ALT 28 03/31/2018    ALKPHOS 56 12/09/2020    ALKPHOS 75 03/31/2018    EGFR 112 03/31/2018     No results found for: \"IRON\", \"TIBC\", \"FERRITIN\"  Lab Results   Component Value Date    LIPASE 139 03/31/2018       Radiology Results:   No results found.  "

## 2024-07-21 ENCOUNTER — TELEPHONE (OUTPATIENT)
Dept: OTHER | Facility: HOSPITAL | Age: 39
End: 2024-07-21

## 2024-07-21 ENCOUNTER — APPOINTMENT (EMERGENCY)
Dept: CT IMAGING | Facility: HOSPITAL | Age: 39
End: 2024-07-21
Payer: COMMERCIAL

## 2024-07-21 ENCOUNTER — HOSPITAL ENCOUNTER (EMERGENCY)
Facility: HOSPITAL | Age: 39
Discharge: HOME | End: 2024-07-21
Attending: EMERGENCY MEDICINE | Admitting: EMERGENCY MEDICINE
Payer: COMMERCIAL

## 2024-07-21 VITALS
TEMPERATURE: 97.8 F | RESPIRATION RATE: 18 BRPM | HEART RATE: 73 BPM | OXYGEN SATURATION: 98 % | SYSTOLIC BLOOD PRESSURE: 133 MMHG | DIASTOLIC BLOOD PRESSURE: 80 MMHG

## 2024-07-21 DIAGNOSIS — R10.9 BILATERAL FLANK PAIN: Primary | ICD-10-CM

## 2024-07-21 DIAGNOSIS — N32.89 MASS OF URINARY BLADDER: ICD-10-CM

## 2024-07-21 LAB
ALBUMIN SERPL BCG-MCNC: 4.8 G/DL (ref 3.5–5)
ALP SERPL-CCNC: 54 U/L (ref 34–104)
ALT SERPL W P-5'-P-CCNC: 13 U/L (ref 7–52)
ANION GAP SERPL CALCULATED.3IONS-SCNC: 9 MMOL/L (ref 4–13)
AST SERPL W P-5'-P-CCNC: 12 U/L (ref 13–39)
BASOPHILS # BLD AUTO: 0.02 THOUSANDS/ÂΜL (ref 0–0.1)
BASOPHILS NFR BLD AUTO: 0 % (ref 0–1)
BILIRUB SERPL-MCNC: 1.22 MG/DL (ref 0.2–1)
BILIRUB UR QL STRIP: NEGATIVE
BUN SERPL-MCNC: 10 MG/DL (ref 5–25)
CALCIUM SERPL-MCNC: 9.4 MG/DL (ref 8.4–10.2)
CHLORIDE SERPL-SCNC: 104 MMOL/L (ref 96–108)
CLARITY UR: CLEAR
CO2 SERPL-SCNC: 26 MMOL/L (ref 21–32)
COLOR UR: ABNORMAL
CREAT SERPL-MCNC: 0.84 MG/DL (ref 0.6–1.3)
EOSINOPHIL # BLD AUTO: 0.04 THOUSAND/ÂΜL (ref 0–0.61)
EOSINOPHIL NFR BLD AUTO: 1 % (ref 0–6)
ERYTHROCYTE [DISTWIDTH] IN BLOOD BY AUTOMATED COUNT: 11.2 % (ref 11.6–15.1)
GFR SERPL CREATININE-BSD FRML MDRD: 110 ML/MIN/1.73SQ M
GLUCOSE SERPL-MCNC: 90 MG/DL (ref 65–140)
GLUCOSE UR STRIP-MCNC: NEGATIVE MG/DL
HCT VFR BLD AUTO: 44.9 % (ref 36.5–49.3)
HGB BLD-MCNC: 15.6 G/DL (ref 12–17)
HGB UR QL STRIP.AUTO: NEGATIVE
IMM GRANULOCYTES # BLD AUTO: 0.01 THOUSAND/UL (ref 0–0.2)
IMM GRANULOCYTES NFR BLD AUTO: 0 % (ref 0–2)
KETONES UR STRIP-MCNC: NEGATIVE MG/DL
LEUKOCYTE ESTERASE UR QL STRIP: NEGATIVE
LYMPHOCYTES # BLD AUTO: 1.2 THOUSANDS/ÂΜL (ref 0.6–4.47)
LYMPHOCYTES NFR BLD AUTO: 19 % (ref 14–44)
MCH RBC QN AUTO: 30.6 PG (ref 26.8–34.3)
MCHC RBC AUTO-ENTMCNC: 34.7 G/DL (ref 31.4–37.4)
MCV RBC AUTO: 88 FL (ref 82–98)
MONOCYTES # BLD AUTO: 0.42 THOUSAND/ÂΜL (ref 0.17–1.22)
MONOCYTES NFR BLD AUTO: 7 % (ref 4–12)
NEUTROPHILS # BLD AUTO: 4.66 THOUSANDS/ÂΜL (ref 1.85–7.62)
NEUTS SEG NFR BLD AUTO: 73 % (ref 43–75)
NITRITE UR QL STRIP: NEGATIVE
NRBC BLD AUTO-RTO: 0 /100 WBCS
PH UR STRIP.AUTO: 7 [PH]
PLATELET # BLD AUTO: 151 THOUSANDS/UL (ref 149–390)
PMV BLD AUTO: 11.8 FL (ref 8.9–12.7)
POTASSIUM SERPL-SCNC: 3.5 MMOL/L (ref 3.5–5.3)
PROT SERPL-MCNC: 7.4 G/DL (ref 6.4–8.4)
PROT UR STRIP-MCNC: NEGATIVE MG/DL
RBC # BLD AUTO: 5.1 MILLION/UL (ref 3.88–5.62)
SODIUM SERPL-SCNC: 139 MMOL/L (ref 135–147)
SP GR UR STRIP.AUTO: <1.005 (ref 1–1.03)
UROBILINOGEN UR STRIP-ACNC: <2 MG/DL
WBC # BLD AUTO: 6.35 THOUSAND/UL (ref 4.31–10.16)

## 2024-07-21 PROCEDURE — 85025 COMPLETE CBC W/AUTO DIFF WBC: CPT | Performed by: EMERGENCY MEDICINE

## 2024-07-21 PROCEDURE — 36415 COLL VENOUS BLD VENIPUNCTURE: CPT | Performed by: EMERGENCY MEDICINE

## 2024-07-21 PROCEDURE — 80053 COMPREHEN METABOLIC PANEL: CPT | Performed by: EMERGENCY MEDICINE

## 2024-07-21 PROCEDURE — 74177 CT ABD & PELVIS W/CONTRAST: CPT

## 2024-07-21 PROCEDURE — 99285 EMERGENCY DEPT VISIT HI MDM: CPT | Performed by: EMERGENCY MEDICINE

## 2024-07-21 PROCEDURE — 81003 URINALYSIS AUTO W/O SCOPE: CPT | Performed by: EMERGENCY MEDICINE

## 2024-07-21 PROCEDURE — 96374 THER/PROPH/DIAG INJ IV PUSH: CPT

## 2024-07-21 PROCEDURE — 99284 EMERGENCY DEPT VISIT MOD MDM: CPT

## 2024-07-21 RX ORDER — KETOROLAC TROMETHAMINE 30 MG/ML
15 INJECTION, SOLUTION INTRAMUSCULAR; INTRAVENOUS ONCE
Status: COMPLETED | OUTPATIENT
Start: 2024-07-21 | End: 2024-07-21

## 2024-07-21 RX ADMIN — KETOROLAC TROMETHAMINE 15 MG: 30 INJECTION, SOLUTION INTRAMUSCULAR; INTRAVENOUS at 14:30

## 2024-07-21 RX ADMIN — IOHEXOL 100 ML: 350 INJECTION, SOLUTION INTRAVENOUS at 15:07

## 2024-07-21 NOTE — ED CARE HANDOFF
Emergency Department Sign Out Note        Sign out and transfer of care from Dr. Forrester. See Separate Emergency Department note.     The patient, Lanre Castro, was evaluated by the previous provider for flank pain.    Workup Completed:  CBC, CMP and UA unremarkable.  CT abd/pelvis pending.  Will likely need urology follow up.    ED Course / Workup Pending (followup):  Bladder lesion noted on CT.  Discussed with urology who recommends outpatient follow up.  Patient informed of finding.  Ambulatory referral placed for urology.  Discussed return precautions with patient.                                     Procedures  Medical Decision Making  Amount and/or Complexity of Data Reviewed  Labs: ordered.  Radiology: ordered.    Risk  Prescription drug management.            Disposition  Final diagnoses:   Bilateral flank pain   Mass of urinary bladder     Time reflects when diagnosis was documented in both MDM as applicable and the Disposition within this note       Time User Action Codes Description Comment    7/21/2024  4:29 PM Ayde Phoenix [R10.9] Bilateral flank pain     7/21/2024  4:29 PM Ayde Phoenix Add [N32.89] Mass of urinary bladder           ED Disposition       ED Disposition   Discharge    Condition   Stable    Date/Time   Sun Jul 21, 2024  4:36 PM    Comment   Lanre Castro discharge to home/self care.                   Follow-up Information       Follow up With Specialties Details Why Contact Info Additional Information    [facility] Schedule an appointment as soon as possible for a visit in 1 week for further evaluation and management of bladder mass [address]  [facility] [id number] [phone] [facility] [place], [address], [place], [place], [address]   [phone]     [facility] Emergency Medicine Go to  If symptoms worsen, difficulty  urinating, fever >100.4F [address] [facility] [address] [phone] [facility] Emergency Department, [address] [place], [place] [address]          Patient's Medications   Discharge Prescriptions    No medications on file            ED Provider  Electronically Signed by     Ayde Phoenix MD  07/21/24 [id number]

## 2024-07-21 NOTE — TELEPHONE ENCOUNTER
Lanre Castro  Is a 39-year-old male evaluated at [facility] emergency room for chief complaint of hematuria.  Mild and not accompanied by anemia, acute kidney injury or infection.  However, CT did demonstrate potential small bladder mass.  Will require immediate follow-up for formal urologic evaluation including cystoscopic examination.  Please contact patient with hospital follow-up appointment date and time.  Thank you

## 2024-07-21 NOTE — ED PROVIDER NOTES
History  Chief Complaint   Patient presents with    Blood in Urine     Pt reports bilateral flank pain since Friday and today Urgent care report that there was blood in the urine. Pt reports discomfort when peeing. Denies fevers.      Patient is a 39-year-old male who presents with bilateral flank pain.  Patient states that he developed a lateral flank pain on Friday that he describes as mild to moderate, constant, worse with moving, tightness/inside type of pain sensation associated with dysuria and urinary frequency and urgency.  States that he went to urgent care and was told that he had blood in his urine and recommended to come to the emergency department for further evaluation.            ED Provider  Electronically Signed by     Prior to Admission Medications   Prescriptions Last Dose Informant Patient Reported? Taking?   dicyclomine (BENTYL) 10 mg capsule  Self No No   Sig: Take 1 capsule (10 mg total) by mouth every 6 (six) hours as needed (abdominal pain/cramping)   Patient not taking: Reported on 10/18/2023      Facility-Administered Medications: None       Past Medical History:   Diagnosis Date    Small intestinal bacterial overgrowth        Past Surgical History:   Procedure Laterality Date    APPENDECTOMY      COLONOSCOPY  02/24/2021    Negative for microscopic colitis    KNEE SURGERY Left     [place] LAPAROSCOPIC APPENDECTOMY N/A 04/01/2018    Procedure: APPENDECTOMY LAPAROSCOPIC;  Surgeon: Idania Bingham MD;  Location: [facility] OR;  Service: General       Family History   Problem Relation Age of Onset    Diabetes Family     Prostate cancer Family     Colon polyps Family     Ulcers Brother     Colon cancer Neg Hx      I have reviewed and agree with the history as documented.    E-Cigarette/Vaping    E-Cigarette Use Never User      E-Cigarette/Vaping Substances     Social History     Tobacco Use    Smoking status: Never     Passive exposure: Never    Smokeless tobacco: Never   Vaping Use    Vaping status:  Never Used   Substance Use Topics    Alcohol use: No     Comment:  no alcohol for at least 1 year ( January, 2021)    Drug use: No       Review of Systems   Constitutional:  Negative for chills and fever.   Respiratory:  Negative for shortness of breath.    Cardiovascular:  Negative for chest pain.   Gastrointestinal:  Negative for abdominal pain, nausea and vomiting.   Genitourinary:  Positive for dysuria, flank pain, frequency and urgency. Negative for decreased urine volume, scrotal swelling and testicular pain.   Musculoskeletal:  Negative for back pain and neck pain.       Physical Exam  Physical Exam  Vitals and nursing note reviewed.   Constitutional:       General: He is not in acute distress.     Appearance: Normal appearance. He is not ill-appearing, toxic-appearing or diaphoretic.   HENT:      Head: Normocephalic and atraumatic.      Mouth/Throat:      Mouth: Mucous membranes are moist.   Eyes:      Conjunctiva/sclera: Conjunctivae normal.      Pupils: Pupils are equal, round, and reactive to light.   Cardiovascular:      Rate and Rhythm: Normal rate and regular rhythm.      Pulses: Normal pulses.      Heart sounds: Normal heart sounds. No murmur heard.  Pulmonary:      Effort: Pulmonary effort is normal. No respiratory distress.      Breath sounds: Normal breath sounds. No stridor. No wheezing, rhonchi or rales.   Chest:      Chest wall: No tenderness.   Abdominal:      General: Bowel sounds are normal. There is no distension.      Palpations: Abdomen is soft.      Tenderness: There is no abdominal tenderness. There is right CVA tenderness and left CVA tenderness. There is no guarding or rebound.   Musculoskeletal:      Lumbar back: Tenderness present. No swelling, edema, deformity, signs of trauma, lacerations, spasms or bony tenderness. Normal range of motion. Negative right straight leg raise test and negative left straight leg raise test. No scoliosis.   Skin:     General: Skin is warm and dry.    Neurological:      General: No focal deficit present.      Mental Status: He is alert and oriented to person, place, and time. Mental status is at baseline.         Vital Signs  ED Triage Vitals   Temperature Pulse Respirations Blood Pressure SpO2   07/21/24 1348 07/21/24 1333 07/21/24 1333 07/21/24 1333 07/21/24 1333   97.8 °F (36.6 °C) 72 20 135/76 100 %      Temp Source Heart Rate Source Patient Position - Orthostatic VS BP Location FiO2 (%)   07/21/24 1348 07/21/24 1333 07/21/24 1333 07/21/24 1333 --   Oral Monitor Sitting Right arm       Pain Score       07/21/24 1333       5           Vitals:    07/21/24 1500 07/21/24 1530 07/21/24 1600 07/21/24 1630   BP: 126/76 131/61 130/71 133/80   Pulse: 67 79 88 73   Patient Position - Orthostatic VS:    Sitting         Visual Acuity      ED Medications  Medications   ketorolac (TORADOL) injection 15 mg (15 mg Intravenous Given 7/21/24 1430)   iohexol (OMNIPAQUE) 350 MG/ML injection (MULTI-DOSE) 100 mL (100 mL Intravenous Given 7/21/24 1507)       Diagnostic Studies  Results Reviewed       Procedure Component Value Units Date/Time    Comprehensive metabolic panel [[id number]]  (Abnormal) Collected: 07/21/24 1351    Lab Status: Final result Specimen: Blood from Arm, Left Updated: 07/21/24 1416     Sodium 139 mmol/L      Potassium 3.5 mmol/L      Chloride 104 mmol/L      CO2 26 mmol/L      ANION GAP 9 mmol/L      BUN 10 mg/dL      Creatinine 0.84 mg/dL      Glucose 90 mg/dL      Calcium 9.4 mg/dL      AST 12 U/L      ALT 13 U/L      Alkaline Phosphatase 54 U/L      Total Protein 7.4 g/dL      Albumin 4.8 g/dL      Total Bilirubin 1.22 mg/dL      eGFR 110 ml/min/1.73sq m     Narrative:      National Kidney Disease Foundation guidelines for Chronic Kidney Disease (CKD):     Stage 1 with normal or high GFR (GFR > 90 mL/min/1.73 square meters)    Stage 2 Mild CKD (GFR = 60-89 mL/min/1.73 square meters)    Stage 3A Moderate CKD (GFR = 45-59 mL/min/1.73 square meters)    Stage  3B Moderate CKD (GFR = 30-44 mL/min/1.73 square meters)    Stage 4 Severe CKD (GFR = 15-29 mL/min/1.73 square meters)    Stage 5 End Stage CKD (GFR <15 mL/min/1.73 square meters)  Note: GFR calculation is accurate only with a steady state creatinine    UA w Reflex to Microscopic w Reflex to Culture [[id number]]  (Abnormal) Collected: 07/21/24 1354    Lab Status: Final result Specimen: Urine, Other Updated: 07/21/24 1403     Color, UA Light Yellow     Clarity, UA Clear     Specific Gravity, UA <1.005     pH, UA 7.0     Leukocytes, UA Negative     Nitrite, UA Negative     Protein, UA Negative mg/dl      Glucose, UA Negative mg/dl      Ketones, UA Negative mg/dl      Urobilinogen, UA <2.0 mg/dl      Bilirubin, UA Negative     Occult Blood, UA Negative    CBC and differential [[id number]]  (Abnormal) Collected: 07/21/24 1351    Lab Status: Final result Specimen: Blood from Arm, Left Updated: 07/21/24 1400     WBC 6.35 Thousand/uL      RBC 5.10 Million/uL      Hemoglobin 15.6 g/dL      Hematocrit 44.9 %      MCV 88 fL      MCH 30.6 pg      MCHC 34.7 g/dL      RDW 11.2 %      MPV 11.8 fL      Platelets 151 Thousands/uL      nRBC 0 /100 WBCs      Segmented % 73 %      Immature Grans % 0 %      Lymphocytes % 19 %      Monocytes % 7 %      Eosinophils Relative 1 %      Basophils Relative 0 %      Absolute Neutrophils 4.66 Thousands/µL      Absolute Immature Grans 0.01 Thousand/uL      Absolute Lymphocytes 1.20 Thousands/µL      Absolute Monocytes 0.42 Thousand/µL      Eosinophils Absolute 0.04 Thousand/µL      Basophils Absolute 0.02 Thousands/µL                    CT abdomen pelvis with contrast   Final Result by Carter Jennings MD (07/21 1624)      Posterior wall midline 5 x 6 mm polyploid lesion projecting into the lumen. Given the history of hematuria, urology consultation recommended.            Workstation performed: [id number]                    Procedures  Procedures         ED Course  ED Course as of 07/22/24 0902    Sun Jul 21, 2024   1611 Patient care signed out to Dr. Phoenix.  Patient is a 39-year-old male who presents with bilateral flank pain ongoing for a week.  Associated with dysuria and urinary frequency/urgency.  Went to urgent care where they he was told that he had blood in his urine, but he has no blood in his urine here.  Labs are within normal limits.  CT abdomen/pelvis pending.                                               Medical Decision Making  Assessment and plan:  Differential includes kidney stones versus pyelonephritis/UTI versus musculoskeletal back pain.  Check labs to evaluate for leukocytosis, anemia, electrolyte amenities, kidney and liver function; urinalysis to assess for urinary tract infection; CT scan abdomen/pelvis to evaluate for aforementioned differential.    Amount and/or Complexity of Data Reviewed  Labs: ordered.  Radiology: ordered.    Risk  Prescription drug management.                 Disposition  Final diagnoses:   Bilateral flank pain   Mass of urinary bladder     Time reflects when diagnosis was documented in both MDM as applicable and the Disposition within this note       Time User Action Codes Description Comment    7/21/2024  4:29 PM Ayde Phoenix Add [R10.9] Bilateral flank pain     7/21/2024  4:29 PM Ayde Phoenix Add [N32.89] Mass of urinary bladder           ED Disposition       ED Disposition   Discharge    Condition   Stable    Date/Time   Sun Jul 21, 2024  4:36 PM    Comment   Lanre Castro discharge to home/self care.                   Follow-up Information       Follow up With Specialties Details Why Contact Info Additional Information    [facility] [place] [facility] Schedule an appointment as soon as possible for a visit in 1 week for further evaluation and management of bladder mass [address]  [facility] [address]  [phone] [facility] [place], [address], [place],  [place], [address]   [phone]     [facility] Emergency Medicine Go to  If symptoms worsen, difficulty urinating, fever >100.4F [address] [facility] [address] [phone] [facility], [address] [facility], [place], [place] [address]            Discharge Medication List as of 7/21/2024  4:38 PM        CONTINUE these medications which have NOT CHANGED    Details   dicyclomine (BENTYL) 10 mg capsule Take 1 capsule (10 mg total) by mouth every 6 (six) hours as needed (abdominal pain/cramping), Starting Fri 10/16/2020, Normal                 PDMP Review       None            ED Provider  Electronically Signed by             Tiara Forrester DO  07/22/24 0903

## 2024-07-21 NOTE — DISCHARGE INSTRUCTIONS
Take 1000 mg of acetaminophen (Tylenol) alone or with 400 mg of ibuprofen (Advil) every 6-8 hours as needed for pain.  Lidocaine patches are available over-the-counter can be found in the back pain aisle of most pharmacies.  Look for 4% lidocaine in the list of the active ingredients.  These patches can be placed for 12 hours.  After 12 hours, discard the patch.  The next patch can be placed 12 hours later.

## 2024-07-22 ENCOUNTER — TELEPHONE (OUTPATIENT)
Age: 39
End: 2024-07-22

## 2024-07-22 NOTE — TELEPHONE ENCOUNTER
Patients GI provider:  Dr. Quarles    Number to return call: ([phone]    Reason for call: Pt called and stated he cannot keep his appt for tomorrow, 07/23/24 for a breath test. Pt asked that we reach out to him to reshedule.     Scheduled procedure/appointment date if applicable: Apt/procedure 07/23/24

## 2024-07-22 NOTE — TELEPHONE ENCOUNTER
Pt calling in to schedule a follow up for a bladder mass. Pt stated he is experiencing a  lot of pain and discomfort and he is very nervous. Please review for urgent follow up. Pt stated he is willing to travel for appointment.

## 2024-07-22 NOTE — TELEPHONE ENCOUNTER
Called and spoke with patient. Scheduled 7/24 at 11:30 (okay per RANULFO). Informed and explained possible cysto procedure with patient. He states understanding. Patient provided office address.

## 2024-07-24 ENCOUNTER — OFFICE VISIT (OUTPATIENT)
Dept: UROLOGY | Facility: AMBULATORY SURGERY CENTER | Age: 39
End: 2024-07-24
Payer: COMMERCIAL

## 2024-07-24 VITALS
HEIGHT: 75 IN | WEIGHT: 166 LBS | HEART RATE: 120 BPM | BODY MASS INDEX: 20.64 KG/M2 | DIASTOLIC BLOOD PRESSURE: 80 MMHG | OXYGEN SATURATION: 99 % | SYSTOLIC BLOOD PRESSURE: 152 MMHG

## 2024-07-24 DIAGNOSIS — N32.89 MASS OF URINARY BLADDER: Primary | ICD-10-CM

## 2024-07-24 LAB
SL AMB  POCT GLUCOSE, UA: NORMAL
SL AMB LEUKOCYTE ESTERASE,UA: NORMAL
SL AMB POCT BILIRUBIN,UA: NORMAL
SL AMB POCT BLOOD,UA: NORMAL
SL AMB POCT CLARITY,UA: CLEAR
SL AMB POCT COLOR,UA: YELLOW
SL AMB POCT KETONES,UA: 80
SL AMB POCT NITRITE,UA: NORMAL
SL AMB POCT PH,UA: 5
SL AMB POCT SPECIFIC GRAVITY,UA: 1.01
SL AMB POCT URINE PROTEIN: NORMAL
SL AMB POCT UROBILINOGEN: 0.2

## 2024-07-24 PROCEDURE — 88112 CYTOPATH CELL ENHANCE TECH: CPT | Performed by: PATHOLOGY

## 2024-07-24 PROCEDURE — 99204 OFFICE O/P NEW MOD 45 MIN: CPT | Performed by: UROLOGY

## 2024-07-24 PROCEDURE — 81002 URINALYSIS NONAUTO W/O SCOPE: CPT | Performed by: UROLOGY

## 2024-07-24 PROCEDURE — 52000 CYSTOURETHROSCOPY: CPT | Performed by: UROLOGY

## 2024-07-24 RX ORDER — CEFAZOLIN SODIUM 1 G/50ML
1000 SOLUTION INTRAVENOUS ONCE
OUTPATIENT
Start: 2024-07-24 | End: 2024-07-24

## 2024-07-24 NOTE — H&P (VIEW-ONLY)
7/24/2024    Lanre Castro  1985  [id number]      1. Mass of urinary bladder  -     Ambulatory Referral to Urology  -     POCT urine dip  -     Case request operating room: TRANSURETHRAL RESECTION OF BLADDER TUMOR (TURBT); Standing  -     Case request operating room: TRANSURETHRAL RESECTION OF BLADDER TUMOR (TURBT)  -     Cystoscopy  -     Cytology, urine  I discussed the findings with the patient and his wife.  We reviewed his history, his CT scan findings as well as cystoscopic findings.  He will need resection of the papillary lesion of the bladder.  Recommend this be done under anesthesia due to his anxiety but also due to difficulty with the location and need for complete resection at the base and cautery.  They are comfortable with this option.  Technique, risk, benefits reviewed.  They understand that final pathology will determine any follow-up in the future.  Likely will be resected with cold cup.  All questions answered to their satisfaction and consent was obtained for cystoscopy, bladder biopsy/TURBT.  No plans for intravesical therapy.      History of Present Illness  Lanre is a 39 y.o. male new patient referred for evaluation.  He developed back pain and went to urgent care as it was out of the ordinary.  He does work with heavy lifting however had not noticed this type of symptom before.  During the course of his evaluation he was found to have microscopic hematuria.  It was suggested that he go to the emergency room for further evaluation.  Evaluation did not reveal infection and labs were normal.  CT scan with contrast revealed normal upper urinary tract however there was indication of small papillary lesion within the bladder.  He was referred for further evaluation.    He denies smoking history.  No history of gross hematuria in the past.  No kidney stones.  We discussed that cystoscopy is important neck step in evaluation.  Risk and benefits reviewed.  He and his wife agreed to proceed  at today's visit to expedite evaluation.       Cystoscopy     Date/Time  7/24/2024 11:30 AM     Performed by  Marquise Lozano MD   Authorized by  Marquise Lozano MD         Procedure Details:  Procedure type: cystoscopy    Additional Procedure Details: Patient was prepped with chlorhexidine and lidocaine jelly.  The flexible cystoscope was placed.  Urethra and prostate are normal.  Evaluation the bladder reveals normal ureteral orifices.  There is a papillary 1 cm lesion to the right at the bladder neck with narrow stalk.  This has the appearance of PUNLMP.  The rest of the bladder mucosa was carefully evaluated and there is no synchronous lesion noted.        Review of Systems   Constitutional: Negative.    HENT: Negative.     Respiratory: Negative.     Cardiovascular: Negative.    Gastrointestinal: Negative.    Genitourinary:         As per HPI   Musculoskeletal: Negative.    Skin: Negative.    Neurological: Negative.    Hematological: Negative.        Past Medical History  Past Medical History:   Diagnosis Date   • Small intestinal bacterial overgrowth        Past Social History  Past Surgical History:   Procedure Laterality Date   • APPENDECTOMY     • COLONOSCOPY  02/24/2021    Negative for microscopic colitis   • KNEE SURGERY Left    • [place] LAPAROSCOPIC APPENDECTOMY N/A 04/01/2018    Procedure: APPENDECTOMY LAPAROSCOPIC;  Surgeon: Idania Bingham MD;  Location: [facility] OR;  Service: General       Past Family History  Family History   Problem Relation Age of Onset   • Diabetes Family    • Prostate cancer Family    • Colon polyps Family    • Ulcers Brother    • Colon cancer Neg Hx        Past Social history  Social History     Socioeconomic History   • Marital status: /Civil Union     Spouse name: Not on file   • Number of children: Not on file   • Years of education: Not on file   • Highest education level: Not on file   Occupational History   • Not on file   Tobacco Use   • Smoking status:  "Never     Passive exposure: Never   • Smokeless tobacco: Never   Vaping Use   • Vaping status: Never Used   Substance and Sexual Activity   • Alcohol use: No     Comment:  no alcohol for at least 1 year ( January, 2021)   • Drug use: No   • Sexual activity: Not on file   Other Topics Concern   • Not on file   Social History Narrative   • Not on file     Social Determinants of Health     Financial Resource Strain: Not on file   Food Insecurity: Not on file   Transportation Needs: Not on file   Physical Activity: Not on file   Stress: Not on file   Social Connections: Not on file   Intimate Partner Violence: Not on file   Housing Stability: Not on file     Social History     Tobacco Use   Smoking Status Never   • Passive exposure: Never   Smokeless Tobacco Never       Current Medications  Current Outpatient Medications   Medication Sig Dispense Refill   • dicyclomine (BENTYL) 10 mg capsule Take 1 capsule (10 mg total) by mouth every 6 (six) hours as needed (abdominal pain/cramping) (Patient not taking: Reported on 10/18/2023) 90 capsule 1     No current facility-administered medications for this visit.       Allergies  Allergies   Allergen Reactions   • Penicillins Other (See Comments)     Happened as an infant so patient does not recall reaction       Past Medical History, Social History, Family History, medications and allergies were reviewed.    Vitals  Vitals:    07/24/24 1121   BP: 152/80   BP Location: Left arm   Patient Position: Sitting   Cuff Size: Standard   Pulse: (!) 120   SpO2: 99%   Weight: 75.3 kg (166 lb)   Height: 6' 3\" (1.905 m)       Physical Exam  Vitals reviewed.   Constitutional:       Appearance: He is well-developed.   HENT:      Head: Normocephalic and atraumatic.   Eyes:      Conjunctiva/sclera: Conjunctivae normal.   Cardiovascular:      Rate and Rhythm: Normal rate.      Heart sounds: Normal heart sounds.   Pulmonary:      Effort: Pulmonary effort is normal.      Breath sounds: Normal " "breath sounds.   Abdominal:      Palpations: Abdomen is soft.   Genitourinary:     Penis: Normal.    Musculoskeletal:         General: Normal range of motion.   Skin:     General: Skin is warm and dry.   Neurological:      Mental Status: He is alert and oriented to person, place, and time.   Psychiatric:         Mood and Affect: Mood normal.           Results  No results found for: \"PSA\"  Lab Results   Component Value Date    CALCIUM 9.4 07/21/2024    K 3.5 07/21/2024    CO2 26 07/21/2024     07/21/2024    BUN 10 07/21/2024    CREATININE 0.84 07/21/2024     Lab Results   Component Value Date    WBC 6.35 07/21/2024    HGB 15.6 07/21/2024    HCT 44.9 07/21/2024    MCV 88 07/21/2024     07/21/2024           "

## 2024-07-24 NOTE — PROGRESS NOTES
7/24/2024    Lanre Castro  1985  [id number]      1. Mass of urinary bladder  -     Ambulatory Referral to Urology  -     POCT urine dip  -     Case request operating room: TRANSURETHRAL RESECTION OF BLADDER TUMOR (TURBT); Standing  -     Case request operating room: TRANSURETHRAL RESECTION OF BLADDER TUMOR (TURBT)  -     Cystoscopy  -     Cytology, urine  I discussed the findings with the patient and his wife.  We reviewed his history, his CT scan findings as well as cystoscopic findings.  He will need resection of the papillary lesion of the bladder.  Recommend this be done under anesthesia due to his anxiety but also due to difficulty with the location and need for complete resection at the base and cautery.  They are comfortable with this option.  Technique, risk, benefits reviewed.  They understand that final pathology will determine any follow-up in the future.  Likely will be resected with cold cup.  All questions answered to their satisfaction and consent was obtained for cystoscopy, bladder biopsy/TURBT.  No plans for intravesical therapy.      History of Present Illness  Lanre is a 39 y.o. male new patient referred for evaluation.  He developed back pain and went to urgent care as it was out of the ordinary.  He does work with heavy lifting however had not noticed this type of symptom before.  During the course of his evaluation he was found to have microscopic hematuria.  It was suggested that he go to the emergency room for further evaluation.  Evaluation did not reveal infection and labs were normal.  CT scan with contrast revealed normal upper urinary tract however there was indication of small papillary lesion within the bladder.  He was referred for further evaluation.    He denies smoking history.  No history of gross hematuria in the past.  No kidney stones.  We discussed that cystoscopy is important neck step in evaluation.  Risk and benefits reviewed.  He and his wife agreed to proceed  at today's visit to expedite evaluation.       Cystoscopy     Date/Time  7/24/2024 11:30 AM     Performed by  Marquise Lozano MD   Authorized by  Marquise Lozano MD         Procedure Details:  Procedure type: cystoscopy    Additional Procedure Details: Patient was prepped with chlorhexidine and lidocaine jelly.  The flexible cystoscope was placed.  Urethra and prostate are normal.  Evaluation the bladder reveals normal ureteral orifices.  There is a papillary 1 cm lesion to the right at the bladder neck with narrow stalk.  This has the appearance of PUNLMP.  The rest of the bladder mucosa was carefully evaluated and there is no synchronous lesion noted.        Review of Systems   Constitutional: Negative.    HENT: Negative.     Respiratory: Negative.     Cardiovascular: Negative.    Gastrointestinal: Negative.    Genitourinary:         As per HPI   Musculoskeletal: Negative.    Skin: Negative.    Neurological: Negative.    Hematological: Negative.        Past Medical History  Past Medical History:   Diagnosis Date   • Small intestinal bacterial overgrowth        Past Social History  Past Surgical History:   Procedure Laterality Date   • APPENDECTOMY     • COLONOSCOPY  02/24/2021    Negative for microscopic colitis   • KNEE SURGERY Left    • OH LAPAROSCOPIC APPENDECTOMY N/A 04/01/2018    Procedure: APPENDECTOMY LAPAROSCOPIC;  Surgeon: Idania Bingham MD;  Location: [facility] OR;  Service: General       Past Family History  Family History   Problem Relation Age of Onset   • Diabetes Family    • Prostate cancer Family    • Colon polyps Family    • Ulcers Brother    • Colon cancer Neg Hx        Past Social history  Social History     Socioeconomic History   • Marital status: /Civil Union     Spouse name: Not on file   • Number of children: Not on file   • Years of education: Not on file   • Highest education level: Not on file   Occupational History   • Not on file   Tobacco Use   • Smoking status:  "Never     Passive exposure: Never   • Smokeless tobacco: Never   Vaping Use   • Vaping status: Never Used   Substance and Sexual Activity   • Alcohol use: No     Comment:  no alcohol for at least 1 year ( January, 2021)   • Drug use: No   • Sexual activity: Not on file   Other Topics Concern   • Not on file   Social History Narrative   • Not on file     Social Determinants of Health     Financial Resource Strain: Not on file   Food Insecurity: Not on file   Transportation Needs: Not on file   Physical Activity: Not on file   Stress: Not on file   Social Connections: Not on file   Intimate Partner Violence: Not on file   Housing Stability: Not on file     Social History     Tobacco Use   Smoking Status Never   • Passive exposure: Never   Smokeless Tobacco Never       Current Medications  Current Outpatient Medications   Medication Sig Dispense Refill   • dicyclomine (BENTYL) 10 mg capsule Take 1 capsule (10 mg total) by mouth every 6 (six) hours as needed (abdominal pain/cramping) (Patient not taking: Reported on 10/18/2023) 90 capsule 1     No current facility-administered medications for this visit.       Allergies  Allergies   Allergen Reactions   • Penicillins Other (See Comments)     Happened as an infant so patient does not recall reaction       Past Medical History, Social History, Family History, medications and allergies were reviewed.    Vitals  Vitals:    07/24/24 1121   BP: 152/80   BP Location: Left arm   Patient Position: Sitting   Cuff Size: Standard   Pulse: (!) 120   SpO2: 99%   Weight: 75.3 kg (166 lb)   Height: 6' 3\" (1.905 m)       Physical Exam  Vitals reviewed.   Constitutional:       Appearance: He is well-developed.   HENT:      Head: Normocephalic and atraumatic.   Eyes:      Conjunctiva/sclera: Conjunctivae normal.   Cardiovascular:      Rate and Rhythm: Normal rate.      Heart sounds: Normal heart sounds.   Pulmonary:      Effort: Pulmonary effort is normal.      Breath sounds: Normal " "breath sounds.   Abdominal:      Palpations: Abdomen is soft.   Genitourinary:     Penis: Normal.    Musculoskeletal:         General: Normal range of motion.   Skin:     General: Skin is warm and dry.   Neurological:      Mental Status: He is alert and oriented to person, place, and time.   Psychiatric:         Mood and Affect: Mood normal.           Results  No results found for: \"PSA\"  Lab Results   Component Value Date    CALCIUM 9.4 07/21/2024    K 3.5 07/21/2024    CO2 26 07/21/2024     07/21/2024    BUN 10 07/21/2024    CREATININE 0.84 07/21/2024     Lab Results   Component Value Date    WBC 6.35 07/21/2024    HGB 15.6 07/21/2024    HCT 44.9 07/21/2024    MCV 88 07/21/2024     07/21/2024           "

## 2024-07-25 ENCOUNTER — TELEPHONE (OUTPATIENT)
Age: 39
End: 2024-07-25

## 2024-07-25 NOTE — TELEPHONE ENCOUNTER
Patient of Dr. Lozano at [facility]    Patient called stating he saw Dr Lozano yesterday and he is to call to schedule surgery.      Patient can be reached at [phone]

## 2024-07-29 ENCOUNTER — TELEPHONE (OUTPATIENT)
Age: 39
End: 2024-07-29

## 2024-07-29 NOTE — TELEPHONE ENCOUNTER
Pt called to speak with SS.  Pt would like to speak with SS regarding status of scheduling his surgery.    Pt call back: [phone]

## 2024-07-29 NOTE — TELEPHONE ENCOUNTER
Cytology  = A. Urine, Clean Catch, :  Negative for high grade urothelial carcinoma (NHGUC) - see comment.  Nuclear changes suggestive polyomavirus.    Urine dip negative  UA = negative - show Specific gravity <1.005    Please advise for patient

## 2024-07-29 NOTE — TELEPHONE ENCOUNTER
Pt called, requesting call back to discuss Cytology,urine/POCT urine dip results.    Pt call back: [phone]

## 2024-07-31 ENCOUNTER — PREP FOR PROCEDURE (OUTPATIENT)
Dept: UROLOGY | Facility: AMBULATORY SURGERY CENTER | Age: 39
End: 2024-07-31

## 2024-07-31 DIAGNOSIS — N32.89 MASS OF URINARY BLADDER: Primary | ICD-10-CM

## 2024-07-31 DIAGNOSIS — R39.89 SUSPECTED UTI: ICD-10-CM

## 2024-07-31 NOTE — TELEPHONE ENCOUNTER
Spoke with patient and confirmed surgery date of: 8/8/2024  Type of surgery: Cysto/TURBT  Operating physician: Dr. Lozano  Location of surgery: [facility]    Verbally went over prep with patient on: 7/31/2024  NPO  Bowel prep? No  Hospital calls afternoon prior with arrival time -Calls Friday afternoon for Monday surgeries  Patient needs ride to and from surgery outpatient  Pre-op testing to be done 2 weeks prior to surgery  Urine culture  Blood thinners:   List blood thinner/how long needs to held or no hold needed  Clearances needed: NONE    emailed to patient on: 7/31/2024  Copy of packet scanned into Media on: 7/31/2024  Labs in packet  Soap / Bowel prep in packet  Pre-op & Post-op in packet  Dates of H&P and post-op if needed    Consent: in Media

## 2024-08-02 RX ORDER — IBUPROFEN 200 MG
TABLET ORAL EVERY 6 HOURS PRN
COMMUNITY

## 2024-08-02 NOTE — PRE-PROCEDURE INSTRUCTIONS
Pre-Surgery Instructions:   Medication Instructions    ibuprofen (MOTRIN) 200 mg tablet Stop taking 3 days prior to surgery.    Medication instructions for day surgery reviewed. Please use only a sip of water to take your instructed medications. Avoid all over the counter vitamins, supplements and NSAIDS for one week prior to surgery per anesthesia guidelines. Tylenol is ok to take as needed.     You will receive a call one business day prior to surgery with an arrival time and hospital directions. If your surgery is scheduled on a Monday, the hospital will be calling you on the Friday prior to your surgery. If you have not heard from anyone by 8pm, please call the hospital supervisor through the hospital  at [phone]. ([place] [phone] or [place] [phone]).    Do not eat or drink anything after midnight the night before your surgery, including candy, mints, lifesavers, or chewing gum. Do not drink alcohol 24hrs before your surgery. Try not to smoke at least 24hrs before your surgery.       Follow the pre surgery showering instructions as listed in the “My Surgical Experience Booklet” or otherwise provided by your surgeon's office. Do not use a blade to shave the surgical area 1 week before surgery. It is okay to use a clean electric clippers up to 24 hours before surgery. Do not apply any lotions, creams, including makeup, cologne, deodorant, or perfumes after showering on the day of your surgery. Do not use dry shampoo, hair spray, hair gel, or any type of hair products.     No contact lenses, eye make-up, or artificial eyelashes. Remove nail polish, including gel polish, and any artificial, gel, or acrylic nails if possible. Remove all jewelry including rings and body piercing jewelry.     Wear causal clothing that is easy to take on and off. Consider your type of surgery.    Keep any valuables, jewelry, piercings at home. Please bring any specially ordered equipment (sling, braces) if  indicated.    Arrange for a responsible person to drive you to and from the hospital on the day of your surgery. Please confirm the visitor policy for the day of your procedure when you receive your phone call with an arrival time.     Call the surgeon's office with any new illnesses, exposures, or additional questions prior to surgery.    Please reference your “My Surgical Experience Booklet” for additional information to prepare for your upcoming surgery.    Pt will get antibacterial soap.

## 2024-08-05 ENCOUNTER — TELEPHONE (OUTPATIENT)
Dept: UROLOGY | Facility: AMBULATORY SURGERY CENTER | Age: 39
End: 2024-08-05

## 2024-08-05 ENCOUNTER — APPOINTMENT (OUTPATIENT)
Dept: LAB | Facility: MEDICAL CENTER | Age: 39
End: 2024-08-05
Payer: COMMERCIAL

## 2024-08-05 DIAGNOSIS — R39.89 SUSPECTED UTI: ICD-10-CM

## 2024-08-05 DIAGNOSIS — N32.89 MASS OF URINARY BLADDER: ICD-10-CM

## 2024-08-05 PROCEDURE — 87086 URINE CULTURE/COLONY COUNT: CPT

## 2024-08-05 NOTE — TELEPHONE ENCOUNTER
Patient called in stating he will go today to have his urine culture done. Any other questions or concerns please call patient.       CB: [phone]

## 2024-08-05 NOTE — TELEPHONE ENCOUNTER
Called patient to let him know that urine culture has not yet been completed for his surgery scheduled for 8/8.  Patient did not answer call and I was unable to lvm. Patient to have urine culture completed today or surgery might need to be rescheduled.

## 2024-08-06 LAB — BACTERIA UR CULT: NORMAL

## 2024-08-07 ENCOUNTER — ANESTHESIA EVENT (OUTPATIENT)
Dept: PERIOP | Facility: HOSPITAL | Age: 39
End: 2024-08-07
Payer: COMMERCIAL

## 2024-08-07 NOTE — ANESTHESIA PREPROCEDURE EVALUATION
Procedure:  TRANSURETHRAL RESECTION OF BLADDER TUMOR (TURBT) (Bladder)    Hx of PONV    LMA vs OETT based on tumor location    Relevant Problems   No relevant active problems        Physical Exam    Airway    Mallampati score: I  TM Distance: >3 FB  Neck ROM: full     Dental   No notable dental hx     Cardiovascular  Rhythm: regular, Rate: normal, Cardiovascular exam normal    Pulmonary  Pulmonary exam normal Breath sounds clear to auscultation    Other Findings        Anesthesia Plan  ASA Score- 2     Anesthesia Type- general with ASA Monitors.         Additional Monitors:     Airway Plan: LMA.    Comment: Risks of general anesthesia discussed including likely possibility of PONV and sore throat, as well as the rare possibilities of aspiration, dental/oropharyngeal/ocular injuries, or grave/life threatening anesthetic and surgical emergencies..       Plan Factors-Exercise tolerance (METS): >4 METS.    Chart reviewed.    Patient summary reviewed.      Patient instructed to abstain from smoking on day of procedure. Patient did not smoke on day of surgery.            Induction- intravenous.    Postoperative Plan- Plan for postoperative opioid use. Planned trial extubation        Informed Consent- Anesthetic plan and risks discussed with patient.  I personally reviewed this patient with the CRNA. Discussed and agreed on the Anesthesia Plan with the CRNA..

## 2024-08-08 ENCOUNTER — TELEPHONE (OUTPATIENT)
Age: 39
End: 2024-08-08

## 2024-08-08 ENCOUNTER — HOSPITAL ENCOUNTER (OUTPATIENT)
Facility: HOSPITAL | Age: 39
Setting detail: OUTPATIENT SURGERY
Discharge: HOME | End: 2024-08-08
Attending: UROLOGY | Admitting: UROLOGY
Payer: COMMERCIAL

## 2024-08-08 ENCOUNTER — ANESTHESIA (OUTPATIENT)
Dept: PERIOP | Facility: HOSPITAL | Age: 39
End: 2024-08-08
Payer: COMMERCIAL

## 2024-08-08 VITALS
OXYGEN SATURATION: 99 % | BODY MASS INDEX: 20.02 KG/M2 | RESPIRATION RATE: 18 BRPM | DIASTOLIC BLOOD PRESSURE: 80 MMHG | TEMPERATURE: 97.8 F | WEIGHT: 164.4 LBS | HEART RATE: 88 BPM | SYSTOLIC BLOOD PRESSURE: 143 MMHG | HEIGHT: 76 IN

## 2024-08-08 DIAGNOSIS — N32.89 MASS OF URINARY BLADDER: Primary | ICD-10-CM

## 2024-08-08 PROCEDURE — 88307 TISSUE EXAM BY PATHOLOGIST: CPT | Performed by: PATHOLOGY

## 2024-08-08 PROCEDURE — 52234 CYSTOSCOPY AND TREATMENT: CPT | Performed by: UROLOGY

## 2024-08-08 PROCEDURE — 88342 IMHCHEM/IMCYTCHM 1ST ANTB: CPT | Performed by: PATHOLOGY

## 2024-08-08 PROCEDURE — 88341 IMHCHEM/IMCYTCHM EA ADD ANTB: CPT | Performed by: PATHOLOGY

## 2024-08-08 RX ORDER — CEFAZOLIN SODIUM 2 G/50ML
2000 SOLUTION INTRAVENOUS ONCE
Status: COMPLETED | OUTPATIENT
Start: 2024-08-08 | End: 2024-08-08

## 2024-08-08 RX ORDER — ONDANSETRON 2 MG/ML
4 INJECTION INTRAMUSCULAR; INTRAVENOUS ONCE AS NEEDED
Status: DISCONTINUED | OUTPATIENT
Start: 2024-08-08 | End: 2024-08-08 | Stop reason: HOSPADM

## 2024-08-08 RX ORDER — LIDOCAINE HYDROCHLORIDE 10 MG/ML
INJECTION, SOLUTION EPIDURAL; INFILTRATION; INTRACAUDAL; PERINEURAL AS NEEDED
Status: DISCONTINUED | OUTPATIENT
Start: 2024-08-08 | End: 2024-08-08

## 2024-08-08 RX ORDER — FENTANYL CITRATE/PF 50 MCG/ML
25 SYRINGE (ML) INJECTION
Status: DISCONTINUED | OUTPATIENT
Start: 2024-08-08 | End: 2024-08-08 | Stop reason: HOSPADM

## 2024-08-08 RX ORDER — HYDROMORPHONE HCL/PF 1 MG/ML
0.5 SYRINGE (ML) INJECTION
Status: DISCONTINUED | OUTPATIENT
Start: 2024-08-08 | End: 2024-08-08 | Stop reason: HOSPADM

## 2024-08-08 RX ORDER — HYDROCODONE BITARTRATE AND ACETAMINOPHEN 5; 325 MG/1; MG/1
1 TABLET ORAL EVERY 4 HOURS PRN
Qty: 5 TABLET | Refills: 0 | Status: SHIPPED | OUTPATIENT
Start: 2024-08-08 | End: 2024-08-09 | Stop reason: SDUPTHER

## 2024-08-08 RX ORDER — FENTANYL CITRATE 50 UG/ML
INJECTION, SOLUTION INTRAMUSCULAR; INTRAVENOUS AS NEEDED
Status: DISCONTINUED | OUTPATIENT
Start: 2024-08-08 | End: 2024-08-08

## 2024-08-08 RX ORDER — CEPHALEXIN 500 MG/1
500 CAPSULE ORAL EVERY 12 HOURS SCHEDULED
Qty: 6 CAPSULE | Refills: 0 | Status: SHIPPED | OUTPATIENT
Start: 2024-08-08 | End: 2024-08-11

## 2024-08-08 RX ORDER — SCOLOPAMINE TRANSDERMAL SYSTEM 1 MG/1
1 PATCH, EXTENDED RELEASE TRANSDERMAL
Status: DISCONTINUED | OUTPATIENT
Start: 2024-08-08 | End: 2024-08-08 | Stop reason: HOSPADM

## 2024-08-08 RX ORDER — DEXAMETHASONE SODIUM PHOSPHATE 10 MG/ML
INJECTION, SOLUTION INTRAMUSCULAR; INTRAVENOUS AS NEEDED
Status: DISCONTINUED | OUTPATIENT
Start: 2024-08-08 | End: 2024-08-08

## 2024-08-08 RX ORDER — SODIUM CHLORIDE, SODIUM LACTATE, POTASSIUM CHLORIDE, CALCIUM CHLORIDE 600; 310; 30; 20 MG/100ML; MG/100ML; MG/100ML; MG/100ML
125 INJECTION, SOLUTION INTRAVENOUS CONTINUOUS
Status: DISCONTINUED | OUTPATIENT
Start: 2024-08-08 | End: 2024-08-08 | Stop reason: HOSPADM

## 2024-08-08 RX ORDER — LIDOCAINE HYDROCHLORIDE 10 MG/ML
0.5 INJECTION, SOLUTION EPIDURAL; INFILTRATION; INTRACAUDAL; PERINEURAL ONCE AS NEEDED
Status: DISCONTINUED | OUTPATIENT
Start: 2024-08-08 | End: 2024-08-08 | Stop reason: HOSPADM

## 2024-08-08 RX ORDER — PROPOFOL 10 MG/ML
INJECTION, EMULSION INTRAVENOUS CONTINUOUS PRN
Status: DISCONTINUED | OUTPATIENT
Start: 2024-08-08 | End: 2024-08-08

## 2024-08-08 RX ORDER — SODIUM CHLORIDE, SODIUM LACTATE, POTASSIUM CHLORIDE, CALCIUM CHLORIDE 600; 310; 30; 20 MG/100ML; MG/100ML; MG/100ML; MG/100ML
INJECTION, SOLUTION INTRAVENOUS CONTINUOUS PRN
Status: DISCONTINUED | OUTPATIENT
Start: 2024-08-08 | End: 2024-08-08

## 2024-08-08 RX ORDER — CEFAZOLIN SODIUM 1 G/50ML
1000 SOLUTION INTRAVENOUS ONCE
Status: DISCONTINUED | OUTPATIENT
Start: 2024-08-08 | End: 2024-08-08

## 2024-08-08 RX ORDER — PROPOFOL 10 MG/ML
INJECTION, EMULSION INTRAVENOUS AS NEEDED
Status: DISCONTINUED | OUTPATIENT
Start: 2024-08-08 | End: 2024-08-08

## 2024-08-08 RX ORDER — HYDROCODONE BITARTRATE AND ACETAMINOPHEN 5; 325 MG/1; MG/1
1 TABLET ORAL EVERY 6 HOURS PRN
Status: DISCONTINUED | OUTPATIENT
Start: 2024-08-08 | End: 2024-08-08 | Stop reason: HOSPADM

## 2024-08-08 RX ORDER — MIDAZOLAM HYDROCHLORIDE 2 MG/2ML
INJECTION, SOLUTION INTRAMUSCULAR; INTRAVENOUS AS NEEDED
Status: DISCONTINUED | OUTPATIENT
Start: 2024-08-08 | End: 2024-08-08

## 2024-08-08 RX ADMIN — FENTANYL CITRATE 25 MCG: 50 INJECTION INTRAMUSCULAR; INTRAVENOUS at 12:39

## 2024-08-08 RX ADMIN — SCOPALAMINE 1 PATCH: 1 PATCH, EXTENDED RELEASE TRANSDERMAL at 11:01

## 2024-08-08 RX ADMIN — MIDAZOLAM 2 MG: 1 INJECTION INTRAMUSCULAR; INTRAVENOUS at 11:30

## 2024-08-08 RX ADMIN — DEXAMETHASONE SODIUM PHOSPHATE 10 MG: 10 INJECTION, SOLUTION INTRAMUSCULAR; INTRAVENOUS at 11:34

## 2024-08-08 RX ADMIN — FENTANYL CITRATE 50 MCG: 50 INJECTION INTRAMUSCULAR; INTRAVENOUS at 11:32

## 2024-08-08 RX ADMIN — FENTANYL CITRATE 25 MCG: 50 INJECTION INTRAMUSCULAR; INTRAVENOUS at 11:38

## 2024-08-08 RX ADMIN — LIDOCAINE HYDROCHLORIDE 50 MG: 10 INJECTION, SOLUTION EPIDURAL; INFILTRATION; INTRACAUDAL; PERINEURAL at 11:32

## 2024-08-08 RX ADMIN — CEFAZOLIN SODIUM 2000 MG: 2 SOLUTION INTRAVENOUS at 11:31

## 2024-08-08 RX ADMIN — FENTANYL CITRATE 25 MCG: 50 INJECTION INTRAMUSCULAR; INTRAVENOUS at 13:12

## 2024-08-08 RX ADMIN — PROPOFOL 200 MG: 10 INJECTION, EMULSION INTRAVENOUS at 11:32

## 2024-08-08 RX ADMIN — PROPOFOL 120 MCG/KG/MIN: 10 INJECTION, EMULSION INTRAVENOUS at 11:35

## 2024-08-08 RX ADMIN — SODIUM CHLORIDE, SODIUM LACTATE, POTASSIUM CHLORIDE, AND CALCIUM CHLORIDE: .6; .31; .03; .02 INJECTION, SOLUTION INTRAVENOUS at 11:30

## 2024-08-08 RX ADMIN — FENTANYL CITRATE 25 MCG: 50 INJECTION INTRAMUSCULAR; INTRAVENOUS at 11:43

## 2024-08-08 RX ADMIN — FENTANYL CITRATE 25 MCG: 50 INJECTION INTRAMUSCULAR; INTRAVENOUS at 12:53

## 2024-08-08 NOTE — TELEPHONE ENCOUNTER
Pt called and stated he presented to the pharmacy to  his medication and was advised the Norco is on back order and an alternative medication is needed.     Pt also wanting to schedule a post op follow up appointment with the provider, please review for appropriate appointment.     Call back: [phone]

## 2024-08-08 NOTE — TELEPHONE ENCOUNTER
Pt's wife called, sts she is taking pt to his appt and will arrive within 5 minutes.  Informed wife that she has called the Urology office, pt is scheduled for surgery.  Wife archie she will contact the phone number she was provided for the hospital location.

## 2024-08-08 NOTE — LETTER
[facility] OPERATING ROOM  [address] [place] [id number]  Dept: [phone]    August 8, 2024     Patient: Lanre Castro   YOB: 1985   Date of Visit: 8/8/2024       To Whom it May Concern:    Lanre Castro is under my professional care. He was seen in the hospital from 8/8/2024 to 08/08/24. He  may return to work with limited capacity lifting 15 pounds. He may return to full activity level on 8/19/2024 .    If you have any questions or concerns, please don't hesitate to call.         Sincerely,          Marquise Lozano MD

## 2024-08-08 NOTE — OP NOTE
OPERATIVE REPORT  PATIENT NAME: Lanre Castro    :  1985  MRN: [id number]  Pt Location: [facility] OR ROOM 03    SURGERY DATE: 2024    Surgeons and Role:     * Marquise Lozano MD - Primary    Preop Diagnosis:  Mass of urinary bladder [N32.89]    Post-Op Diagnosis Codes:     * Mass of urinary bladder [N32.89]    Procedure(s):  TRANSURETHRAL RESECTION OF BLADDER TUMOR (TURBT) 1 cm    Specimen(s):  ID Type Source Tests Collected by Time Destination   1 : BLADDER TUMOR Tissue Urinary Bladder TISSUE EXAM Marquise Lozano MD 2024 1147        Estimated Blood Loss:   Minimal    Drains:  Urethral Catheter Latex 18 Fr. (Active)   Site Assessment Clean;Skin intact 24 1158   Number of days: 0       Anesthesia Type:   General    Operative Indications:  Mass of urinary bladder [N32.89]      Operative Findings:  1 cm papillary tumor at right bladder neck.  Mild prostate bleeding fulgurated.      Complications:   None    Procedure and Technique:  Patient was identified, brought to the operating room, and placed on the table in supine position.  After induction of general anesthesia he was placed in the dorsolithotomy position and prepped and draped in usual sterile fashion.  A formal timeout performed.    A 22 Peruvian rigid scope was placed per urethra.  Prostate is normal size for age.  Papillary 1 cm lesion with narrow stalk was identified at the right bladder neck.  The rest of the bladder was carefully evaluated.  Ureteral orifices are normal.  There is no synchronous lesion noted throughout the bladder.    A cold cup forceps was placed in the papillary lesion was retrieved.  A second resection was then performed at the base.  Brisk bleeding was noted and controlled with Bugbee cautery.  There was oozing noted from several locations within the prostate.  This was carefully cauterized.  Reinspection revealed good hemostasis.  18 Peruvian Gomez catheter was placed temporarily.  Patient tolerated the  procedure well.   I was present for the entire procedure.    Patient Disposition:  PACU         SIGNATURE: Marquise Lozano MD  DATE: August 8, 2024  TIME: 12:15 PM

## 2024-08-09 DIAGNOSIS — N32.89 MASS OF URINARY BLADDER: ICD-10-CM

## 2024-08-09 RX ORDER — HYDROCODONE BITARTRATE AND ACETAMINOPHEN 5; 325 MG/1; MG/1
1 TABLET ORAL EVERY 4 HOURS PRN
Qty: 5 TABLET | Refills: 0 | Status: SHIPPED | OUTPATIENT
Start: 2024-08-09 | End: 2024-08-11

## 2024-08-09 NOTE — TELEPHONE ENCOUNTER
Post Op Note    Lanre Castro is a 39 y.o. male s/p TRANSURETHRAL RESECTION OF BLADDER TUMOR (TURBT) (Bladder)  performed 8/8/2024.  Lanre Castro is a patient of Dr. Lozano and is seen at the [facility] office.     Pain after surgery? Patient advises his pain is better today and not having much burning with urination as he was having.     Do you have any difficulty urinating? No. Patient advises his urine has cleared and it is yellow and has a tint of pink/red. Advised that is normal and to continue to hydrate well with water.    Do you have any other questions or concerns that I can address at this time?       Spoke to patient and he is doing well after surgery. Advised of Dr. Lozano's recommendation of awaiting for the biopsy results to be finalized to determine follow up plan. Advised the results can take up to 2 weeks for it to be finalized. He is understanding. Advised to contact the office in the meantime with any questions or concerns.

## 2024-08-13 PROCEDURE — 88342 IMHCHEM/IMCYTCHM 1ST ANTB: CPT | Performed by: PATHOLOGY

## 2024-08-13 PROCEDURE — 88307 TISSUE EXAM BY PATHOLOGIST: CPT | Performed by: PATHOLOGY

## 2024-08-13 PROCEDURE — 88341 IMHCHEM/IMCYTCHM EA ADD ANTB: CPT | Performed by: PATHOLOGY

## 2024-08-13 NOTE — TELEPHONE ENCOUNTER
Patient called for  final Tissue exam results available in chartdone 8/8/24    Please advise     CB#[phone]

## 2024-08-15 NOTE — TELEPHONE ENCOUNTER
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  Recommend follow-up office cystoscopy in 6 months and then as needed as long as there is no additional finding.

## 2024-08-15 NOTE — TELEPHONE ENCOUNTER
Called Jadon and notified him that Dr Lozano states is polyp is benign. He was very relieved. Scheduled him for a Cysto in 6 months at the [facility]

## 2024-08-20 ENCOUNTER — OFFICE VISIT (OUTPATIENT)
Dept: URGENT CARE | Facility: CLINIC | Age: 39
End: 2024-08-20
Payer: COMMERCIAL

## 2024-08-20 VITALS
HEART RATE: 71 BPM | HEIGHT: 76 IN | SYSTOLIC BLOOD PRESSURE: 130 MMHG | DIASTOLIC BLOOD PRESSURE: 74 MMHG | BODY MASS INDEX: 20.01 KG/M2 | OXYGEN SATURATION: 99 % | TEMPERATURE: 97.8 F | RESPIRATION RATE: 16 BRPM

## 2024-08-20 DIAGNOSIS — R39.9 UTI SYMPTOMS: Primary | ICD-10-CM

## 2024-08-20 LAB
SL AMB  POCT GLUCOSE, UA: ABNORMAL
SL AMB LEUKOCYTE ESTERASE,UA: ABNORMAL
SL AMB POCT BILIRUBIN,UA: ABNORMAL
SL AMB POCT BLOOD,UA: ABNORMAL
SL AMB POCT CLARITY,UA: CLEAR
SL AMB POCT COLOR,UA: YELLOW
SL AMB POCT KETONES,UA: ABNORMAL
SL AMB POCT NITRITE,UA: ABNORMAL
SL AMB POCT PH,UA: 7
SL AMB POCT SPECIFIC GRAVITY,UA: 1.01
SL AMB POCT URINE PROTEIN: ABNORMAL
SL AMB POCT UROBILINOGEN: 4

## 2024-08-20 PROCEDURE — 81002 URINALYSIS NONAUTO W/O SCOPE: CPT | Performed by: PHYSICIAN ASSISTANT

## 2024-08-20 PROCEDURE — G0382 LEV 3 HOSP TYPE B ED VISIT: HCPCS | Performed by: PHYSICIAN ASSISTANT

## 2024-08-20 NOTE — PATIENT INSTRUCTIONS
Call your urologist today to discuss your symptoms with them and follow-up within the next 1 to 2 days.  If any new or worsening symptoms develop proceed to the ER for further evaluation.  Urine culture results to be available in a few days

## 2024-08-20 NOTE — PROGRESS NOTES
[facility]        NAME: Lanre Castro is a 39 y.o. male  : 1985    MRN: [id number]  DATE: 2024  TIME: 7:51 PM    Assessment and Plan   No primary diagnosis found.  No diagnosis found.      Patient Instructions       Follow up with PCP in 3-5 days.  Proceed to  ER if symptoms worsen.    If tests have been performed at [facility], our office will contact you with results if changes need to be made to the care plan discussed with you at the visit.  [facility]    Chief Complaint   No chief complaint on file.        History of Present Illness       Patient presents with bilateral lower back pain starting yesterday.  Had a history of bladder surgery recently and has had dysuria and hematuria since then.  Was told by his urologist that this would last for couple weeks.  Dysuria was improving but then started to worsen again last week.  Denies fevers, muscle or bodyaches, fatigue.  States he called his urologist but did not hear back.    Review of Systems   Review of Systems   Constitutional:  Negative for fever.   Gastrointestinal:  Negative for abdominal pain.   Genitourinary:  Positive for flank pain. Negative for dysuria, frequency and hematuria.   Musculoskeletal:  Positive for back pain.         Current Medications       Current Outpatient Medications:   •  dicyclomine (BENTYL) 10 mg capsule, Take 1 capsule (10 mg total) by mouth every 6 (six) hours as needed (abdominal pain/cramping) (Patient not taking: Reported on 10/18/2023), Disp: 90 capsule, Rfl: 1  •  ibuprofen (MOTRIN) 200 mg tablet, Take by mouth every 6 (six) hours as needed for mild pain, Disp: , Rfl:     Current Allergies     Allergies as of 2024 - Reviewed 2024   Allergen Reaction Noted   • Penicillins Other (See Comments) 2018            The following portions of the patient's history were reviewed and updated as appropriate: allergies, current medications, past  family history, past medical history, past social history, past surgical history and problem list.     Past Medical History:   Diagnosis Date   • Cancer (HCC)     bladder   • Motion sickness    • Small intestinal bacterial overgrowth        Past Surgical History:   Procedure Laterality Date   • APPENDECTOMY     • COLONOSCOPY  02/24/2021    Negative for microscopic colitis   • KNEE SURGERY Left    • ID CYSTO W/REMOVAL OF LESIONS SMALL N/A 8/8/2024    Procedure: TRANSURETHRAL RESECTION OF BLADDER TUMOR (TURBT);  Surgeon: Marquise Lozano MD;  Location:  MAIN OR;  Service: Urology   • ID LAPAROSCOPIC APPENDECTOMY N/A 04/01/2018    Procedure: APPENDECTOMY LAPAROSCOPIC;  Surgeon: Idania Bingham MD;  Location:  MAIN OR;  Service: General       Family History   Problem Relation Age of Onset   • Diabetes Family    • Prostate cancer Family    • Colon polyps Family    • Ulcers Brother    • Colon cancer Neg Hx          Medications have been verified.        Objective   There were no vitals taken for this visit.  No LMP for male patient.       Physical Exam     Physical Exam  Constitutional:       Appearance: Normal appearance.   Abdominal:      General: Abdomen is flat. Bowel sounds are normal.      Palpations: Abdomen is soft.      Tenderness: There is no abdominal tenderness. There is no right CVA tenderness, left CVA tenderness, guarding or rebound.   Musculoskeletal:         General: No swelling or tenderness.   Skin:     Findings: No bruising.   Neurological:      Mental Status: He is alert.   Psychiatric:         Mood and Affect: Mood normal.         Behavior: Behavior normal.                    Psychiatric:         Mood and Affect: Mood normal.         Behavior: Behavior normal.

## 2024-10-18 ENCOUNTER — OFFICE VISIT (OUTPATIENT)
Dept: GASTROENTEROLOGY | Facility: CLINIC | Age: 39
End: 2024-10-18
Payer: COMMERCIAL

## 2024-10-18 VITALS
BODY MASS INDEX: 20.34 KG/M2 | WEIGHT: 167 LBS | HEIGHT: 76 IN | DIASTOLIC BLOOD PRESSURE: 70 MMHG | SYSTOLIC BLOOD PRESSURE: 120 MMHG

## 2024-10-18 DIAGNOSIS — D41.4 BLADDER PAPILLOMA: ICD-10-CM

## 2024-10-18 DIAGNOSIS — K63.8219 SMALL INTESTINAL BACTERIAL OVERGROWTH (SIBO): ICD-10-CM

## 2024-10-18 DIAGNOSIS — R14.0 ABDOMINAL BLOATING: Primary | ICD-10-CM

## 2024-10-18 PROCEDURE — 99213 OFFICE O/P EST LOW 20 MIN: CPT | Performed by: STUDENT IN AN ORGANIZED HEALTH CARE EDUCATION/TRAINING PROGRAM

## 2024-10-18 NOTE — PATIENT INSTRUCTIONS
We will check a breath test for SIBO. We will follow up the result and see you back in the office in a few months. If the breath test is negative and you continue to have symptoms, we can consider an upper endoscopy.

## 2024-10-18 NOTE — PROGRESS NOTES
[facility] - Outpatient Follow-up Note  Lanre Castro 39 y.o. male MRN: [id number]  Encounter: [id number]    ASSESSMENT AND PLAN:           1. Abdominal bloating  Given his history of SIBO we will recheck breath testing.  He was unable to  his breath test after his last visit with everything else going on.  If this is negative and symptoms are ongoing we discussed potentially getting an upper endoscopy with biopsies to rule out any malabsorption issues.  I do suspect a component of IBS given his symptom correlation with stress.  - Small intestinal bacterial overgrowth    2. Small intestinal bacterial overgrowth (SIBO)  History of SIBO, will recheck breath test in case he needs antibiotics.    3. Bladder papilloma  S/p resection, doing well, has a low bit of bladder soreness, will defer to urology.      Follow up appointment: 3 months    _______________________      Chief Complaint   Patient presents with    Follow-up     Pt asking for SIBO test to take home       HPI:   Lanre is a pleasant 39 year old male with past medical history of SIBO who presents for chronic GI issues including bloating and occasional loose stools.  Since her last visit he had an ER visit for flank pain and Insil finding of a bladder lesion on CT.  He went for a cystoscopy with removal and this was a benign papilloma.  He still has occasional bladder soreness after his procedure.  His GI symptoms have actually improved somewhat since her last visit however he still is occasional cramping, bloating and changes in his stool.  As previously noted he had a colonoscopy 2021 which was unremarkable.    Historical Information   Past Medical History:   Diagnosis Date    Cancer (HCC)     bladder    Motion sickness     Small intestinal bacterial overgrowth      Past Surgical History:   Procedure Laterality Date    APPENDECTOMY      COLONOSCOPY  02/24/2021    Negative for microscopic colitis    KNEE  "SURGERY Left     MO CYSTO W/REMOVAL OF LESIONS SMALL N/A 8/8/2024    Procedure: TRANSURETHRAL RESECTION OF BLADDER TUMOR (TURBT);  Surgeon: Marquise Lozano MD;  Location:  MAIN OR;  Service: Urology    MO LAPAROSCOPIC APPENDECTOMY N/A 04/01/2018    Procedure: APPENDECTOMY LAPAROSCOPIC;  Surgeon: Idania Bingham MD;  Location:  MAIN OR;  Service: General     Social History     Substance and Sexual Activity   Alcohol Use No    Comment:  no alcohol for at least 1 year ( January, 2021)     Social History     Substance and Sexual Activity   Drug Use No     Social History     Tobacco Use   Smoking Status Never    Passive exposure: Never   Smokeless Tobacco Never     Family History   Problem Relation Age of Onset    Diabetes Family     Prostate cancer Family     Colon polyps Family     Ulcers Brother     Colon cancer Neg Hx          Current Outpatient Medications:     dicyclomine (BENTYL) 10 mg capsule    ibuprofen (MOTRIN) 200 mg tablet  Allergies   Allergen Reactions    Penicillins Other (See Comments)     Happened as an infant so patient does not recall reaction     Reviewed medications and allergies and updated as indicated    PHYSICAL EXAM:    Blood pressure 120/70, height 6' 4\" (1.93 m), weight 75.8 kg (167 lb). Body mass index is 20.33 kg/m².  General Appearance: NAD, cooperative, alert  Eyes: Anicteric  GI:  Soft, non-tender, non-distended; normal bowel sounds; no masses, no organomegaly   Rectal: Deferred  Musculoskeletal: No edema.  Skin:  No jaundice    Lab Results:   Lab Results   Component Value Date    WBC 6.35 07/21/2024    WBC 7.1 12/09/2020    WBC 16.04 (H) 03/31/2018    HGB 15.6 07/21/2024    HGB 15.8 12/09/2020    HGB 16.3 03/31/2018    MCV 88 07/21/2024     07/21/2024     12/09/2020     03/31/2018     Lab Results   Component Value Date    K 3.5 07/21/2024     07/21/2024    CO2 26 07/21/2024    BUN 10 07/21/2024    CREATININE 0.84 07/21/2024    CALCIUM 9.4 " "07/21/2024    AST 12 (L) 07/21/2024    AST 13 12/09/2020    AST 10 03/31/2018    ALT 13 07/21/2024    ALT 18 12/09/2020    ALT 28 03/31/2018    ALKPHOS 54 07/21/2024    ALKPHOS 56 12/09/2020    ALKPHOS 75 03/31/2018    EGFR 110 07/21/2024     No results found for: \"IRON\", \"TIBC\", \"FERRITIN\"  Lab Results   Component Value Date    LIPASE 139 03/31/2018       Radiology Results:   No results found.  "

## 2024-10-28 ENCOUNTER — HOSPITAL ENCOUNTER (EMERGENCY)
Facility: HOSPITAL | Age: 39
Discharge: HOME | End: 2024-10-28
Attending: EMERGENCY MEDICINE
Payer: COMMERCIAL

## 2024-10-28 ENCOUNTER — APPOINTMENT (EMERGENCY)
Dept: CT IMAGING | Facility: HOSPITAL | Age: 39
End: 2024-10-28
Payer: COMMERCIAL

## 2024-10-28 VITALS
TEMPERATURE: 97.9 F | HEART RATE: 89 BPM | SYSTOLIC BLOOD PRESSURE: 121 MMHG | RESPIRATION RATE: 16 BRPM | OXYGEN SATURATION: 97 % | DIASTOLIC BLOOD PRESSURE: 69 MMHG

## 2024-10-28 DIAGNOSIS — E87.6 HYPOKALEMIA DUE TO EXCESSIVE GASTROINTESTINAL LOSS OF POTASSIUM: ICD-10-CM

## 2024-10-28 DIAGNOSIS — K52.9 ACUTE GASTROENTERITIS: Primary | ICD-10-CM

## 2024-10-28 DIAGNOSIS — E83.42 HYPOMAGNESEMIA: ICD-10-CM

## 2024-10-28 LAB
ALBUMIN SERPL BCG-MCNC: 5.1 G/DL (ref 3.5–5)
ALP SERPL-CCNC: 60 U/L (ref 34–104)
ALT SERPL W P-5'-P-CCNC: 11 U/L (ref 7–52)
ANION GAP SERPL CALCULATED.3IONS-SCNC: 10 MMOL/L (ref 4–13)
AST SERPL W P-5'-P-CCNC: 10 U/L (ref 13–39)
BASOPHILS # BLD AUTO: 0.02 THOUSANDS/ΜL (ref 0–0.1)
BASOPHILS NFR BLD AUTO: 0 % (ref 0–1)
BILIRUB SERPL-MCNC: 1.75 MG/DL (ref 0.2–1)
BUN SERPL-MCNC: 12 MG/DL (ref 5–25)
CALCIUM SERPL-MCNC: 9.9 MG/DL (ref 8.4–10.2)
CHLORIDE SERPL-SCNC: 103 MMOL/L (ref 96–108)
CO2 SERPL-SCNC: 27 MMOL/L (ref 21–32)
CREAT SERPL-MCNC: 0.86 MG/DL (ref 0.6–1.3)
EOSINOPHIL # BLD AUTO: 0.02 THOUSAND/ΜL (ref 0–0.61)
EOSINOPHIL NFR BLD AUTO: 0 % (ref 0–6)
ERYTHROCYTE [DISTWIDTH] IN BLOOD BY AUTOMATED COUNT: 11.2 % (ref 11.6–15.1)
GFR SERPL CREATININE-BSD FRML MDRD: 109 ML/MIN/1.73SQ M
GLUCOSE SERPL-MCNC: 134 MG/DL (ref 65–140)
HCT VFR BLD AUTO: 48.9 % (ref 36.5–49.3)
HGB BLD-MCNC: 17.3 G/DL (ref 12–17)
IMM GRANULOCYTES # BLD AUTO: 0.13 THOUSAND/UL (ref 0–0.2)
IMM GRANULOCYTES NFR BLD AUTO: 1 % (ref 0–2)
LACTATE SERPL-SCNC: 2 MMOL/L (ref 0.5–2)
LIPASE SERPL-CCNC: 22 U/L (ref 11–82)
LYMPHOCYTES # BLD AUTO: 0.6 THOUSANDS/ΜL (ref 0.6–4.47)
LYMPHOCYTES NFR BLD AUTO: 4 % (ref 14–44)
MAGNESIUM SERPL-MCNC: 1.6 MG/DL (ref 1.9–2.7)
MCH RBC QN AUTO: 31.3 PG (ref 26.8–34.3)
MCHC RBC AUTO-ENTMCNC: 35.4 G/DL (ref 31.4–37.4)
MCV RBC AUTO: 89 FL (ref 82–98)
MONOCYTES # BLD AUTO: 1.32 THOUSAND/ΜL (ref 0.17–1.22)
MONOCYTES NFR BLD AUTO: 8 % (ref 4–12)
NEUTROPHILS # BLD AUTO: 15 THOUSANDS/ΜL (ref 1.85–7.62)
NEUTS SEG NFR BLD AUTO: 87 % (ref 43–75)
NRBC BLD AUTO-RTO: 0 /100 WBCS
PLATELET # BLD AUTO: 176 THOUSANDS/UL (ref 149–390)
PMV BLD AUTO: 12 FL (ref 8.9–12.7)
POTASSIUM SERPL-SCNC: 3.2 MMOL/L (ref 3.5–5.3)
PROT SERPL-MCNC: 8 G/DL (ref 6.4–8.4)
RBC # BLD AUTO: 5.52 MILLION/UL (ref 3.88–5.62)
SODIUM SERPL-SCNC: 140 MMOL/L (ref 135–147)
WBC # BLD AUTO: 17.09 THOUSAND/UL (ref 4.31–10.16)

## 2024-10-28 PROCEDURE — 83605 ASSAY OF LACTIC ACID: CPT | Performed by: EMERGENCY MEDICINE

## 2024-10-28 PROCEDURE — 74177 CT ABD & PELVIS W/CONTRAST: CPT

## 2024-10-28 PROCEDURE — 96365 THER/PROPH/DIAG IV INF INIT: CPT

## 2024-10-28 PROCEDURE — 83690 ASSAY OF LIPASE: CPT | Performed by: EMERGENCY MEDICINE

## 2024-10-28 PROCEDURE — 83735 ASSAY OF MAGNESIUM: CPT | Performed by: EMERGENCY MEDICINE

## 2024-10-28 PROCEDURE — 80053 COMPREHEN METABOLIC PANEL: CPT | Performed by: EMERGENCY MEDICINE

## 2024-10-28 PROCEDURE — 99285 EMERGENCY DEPT VISIT HI MDM: CPT | Performed by: EMERGENCY MEDICINE

## 2024-10-28 PROCEDURE — 85025 COMPLETE CBC W/AUTO DIFF WBC: CPT | Performed by: EMERGENCY MEDICINE

## 2024-10-28 PROCEDURE — 96361 HYDRATE IV INFUSION ADD-ON: CPT

## 2024-10-28 PROCEDURE — 99284 EMERGENCY DEPT VISIT MOD MDM: CPT

## 2024-10-28 PROCEDURE — 36415 COLL VENOUS BLD VENIPUNCTURE: CPT | Performed by: EMERGENCY MEDICINE

## 2024-10-28 PROCEDURE — 96375 TX/PRO/DX INJ NEW DRUG ADDON: CPT

## 2024-10-28 RX ORDER — ONDANSETRON 4 MG/1
4 TABLET, ORALLY DISINTEGRATING ORAL EVERY 6 HOURS PRN
Qty: 15 TABLET | Refills: 0 | Status: SHIPPED | OUTPATIENT
Start: 2024-10-28

## 2024-10-28 RX ORDER — MAGNESIUM SULFATE 1 G/100ML
1 INJECTION INTRAVENOUS ONCE
Status: COMPLETED | OUTPATIENT
Start: 2024-10-28 | End: 2024-10-28

## 2024-10-28 RX ORDER — ONDANSETRON 2 MG/ML
4 INJECTION INTRAMUSCULAR; INTRAVENOUS ONCE
Status: COMPLETED | OUTPATIENT
Start: 2024-10-28 | End: 2024-10-28

## 2024-10-28 RX ORDER — HYDROMORPHONE HCL/PF 1 MG/ML
0.5 SYRINGE (ML) INJECTION EVERY 2 HOUR PRN
Status: DISCONTINUED | OUTPATIENT
Start: 2024-10-28 | End: 2024-10-28 | Stop reason: HOSPADM

## 2024-10-28 RX ADMIN — ONDANSETRON 4 MG: 2 INJECTION, SOLUTION INTRAMUSCULAR; INTRAVENOUS at 06:43

## 2024-10-28 RX ADMIN — SODIUM CHLORIDE 1000 ML: 0.9 INJECTION, SOLUTION INTRAVENOUS at 06:42

## 2024-10-28 RX ADMIN — IOHEXOL 100 ML: 350 INJECTION, SOLUTION INTRAVENOUS at 07:46

## 2024-10-28 RX ADMIN — HYDROMORPHONE HYDROCHLORIDE 0.5 MG: 1 INJECTION, SOLUTION INTRAMUSCULAR; INTRAVENOUS; SUBCUTANEOUS at 06:53

## 2024-10-28 RX ADMIN — SODIUM CHLORIDE 1000 ML: 0.9 INJECTION, SOLUTION INTRAVENOUS at 08:08

## 2024-10-28 RX ADMIN — MAGNESIUM SULFATE HEPTAHYDRATE 1 G: 1 INJECTION, SOLUTION INTRAVENOUS at 07:25

## 2024-10-28 NOTE — Clinical Note
Lanre Castro was seen and treated in our emergency department on 10/28/2024.                Diagnosis:     Lanre  may return to work on return date.    He may return on this date: 10/29/2024         If you have any questions or concerns, please don't hesitate to call.      Douglas Vegas, DO    ______________________________           _______________          _______________  Hospital Representative                              Date                                Time
  Cervical: No cervical adenopathy.   Skin:     General: Skin is warm.      Findings: No rash.   Neurological:      Mental Status: She is alert and oriented to person, place, and time.   Psychiatric:         Mood and Affect: Mood normal.         Behavior: Behavior normal.         Thought Content: Thought content normal.         Assessment:       Diagnosis Orders   1. Medication management        2. Anxiety  escitalopram (LEXAPRO) 20 MG tablet      3. BMI 50.0-59.9, adult (HCC)        4. Borderline hypertension             Plan:      Return in about 2 months (around 4/15/2024) for hypertension.  Discussed intermittent fasting and meds  She can't take phentermine  She will let me know    No orders of the defined types were placed in this encounter.    Orders Placed This Encounter   Medications    escitalopram (LEXAPRO) 20 MG tablet     Sig: Take 1 tablet by mouth daily     Dispense:  30 tablet     Refill:  3       Patient given educationalmaterials - see patient instructions.  Discussed use, benefit, and side effectsof prescribed medications.  All patient questions answered. Pt voiced understanding.Reviewed health maintenance.  Instructed to continue current medications, diet andexercise.  Patient agreed with treatment plan. Follow up as directed.     Electronicallysigned by Cynthia Duran MD on 2/15/2024 at 10:03 AM

## 2024-10-28 NOTE — ED PROVIDER NOTES
Time reflects when diagnosis was documented in both MDM as applicable and the Disposition within this note       Time User Action Codes Description Comment    10/28/2024  8:44 AM Douglas Vegas Add [K52.9] Acute gastroenteritis     10/28/2024  8:50 AM Douglas Vegas Add [E83.42] Hypomagnesemia     10/28/2024  8:50 AM Douglas Vegas Add [E87.6] Hypokalemia due to excessive gastrointestinal loss of potassium           ED Disposition       ED Disposition   Discharge    Condition   Stable    Date/Time   Mon Oct 28, 2024  8:50 AM    Comment   Lanre Gloria discharge to home/self care.                   Assessment & Plan       Medical Decision Making  DDx: vomiting, diarrhea, gastroenteritis, colitis, diveriticulitis, food poisoning   Plan for labs, IV fluids, antiemetics    Noted of white count will obtain CT.  No acute findings on the CT and the patient was significantly improved upon reevaluation after fluids antiemetics.  Discussed plan for discharge with Zofran ODT and close follow-up.    The patient (and any family present) verbalized understanding of the discharge instructions and warnings that would necessitate return to the Emergency Department.    All questions were answered prior to discharge.    Amount and/or Complexity of Data Reviewed  Labs: ordered. Decision-making details documented in ED Course.  Radiology: ordered.    Risk  Prescription drug management.        ED Course as of 10/28/24 0854   Mon Oct 28, 2024   0723 WBC(!): 17.09  Will obtain CT A/P     0723 MAGNESIUM(!): 1.6  Will replete IV   0849 Improved upon reevaluation.  Patient tolerating fluids.  Plan for discharge and outpatient follow-up.  Discussed utilization of sports drinks and bananas for electrolyte replacement       Medications   HYDROmorphone (DILAUDID) injection 0.5 mg (0.5 mg Intravenous Given 10/28/24 0653)   sodium chloride 0.9 % bolus 1,000 mL (1,000 mL Intravenous New Bag 10/28/24 0808)   sodium chloride 0.9 % bolus 1,000 mL  (0 mL Intravenous Stopped 10/28/24 0742)   ondansetron (ZOFRAN) injection 4 mg (4 mg Intravenous Given 10/28/24 0643)   magnesium sulfate IVPB (premix) SOLN 1 g (1 g Intravenous New Bag 10/28/24 0725)   iohexol (OMNIPAQUE) 350 MG/ML injection (MULTI-DOSE) 100 mL (100 mL Intravenous Given 10/28/24 0746)       ED Risk Strat Scores                           SBIRT 22yo+      Flowsheet Row Most Recent Value   Initial Alcohol Screen: US AUDIT-C     1. How often do you have a drink containing alcohol? 0 Filed at: 10/28/2024 0635   2. How many drinks containing alcohol do you have on a typical day you are drinking?  0 Filed at: 10/28/2024 0635   3a. Male UNDER 65: How often do you have five or more drinks on one occasion? 0 Filed at: 10/28/2024 0635   3b. FEMALE Any Age, or MALE 65+: How often do you have 4 or more drinks on one occassion? 0 Filed at: 10/28/2024 0635   Audit-C Score 0 Filed at: 10/28/2024 0635   CAR: How many times in the past year have you...    Used an illegal drug or used a prescription medication for non-medical reasons? Never Filed at: 10/28/2024 0635                            History of Present Illness       Chief Complaint   Patient presents with    Abdominal Pain     Patient reports abdominal pain that radiates to his back, nausea, vomiting, and diarrhea  that started at 0300 this morning.  No medications taken.       Past Medical History:   Diagnosis Date    Cancer (HCC)     bladder    Motion sickness     Small intestinal bacterial overgrowth       Past Surgical History:   Procedure Laterality Date    APPENDECTOMY      COLONOSCOPY  02/24/2021    Negative for microscopic colitis    KNEE SURGERY Left     WI CYSTO W/REMOVAL OF LESIONS SMALL N/A 8/8/2024    Procedure: TRANSURETHRAL RESECTION OF BLADDER TUMOR (TURBT);  Surgeon: Marquise Lozano MD;  Location:  MAIN OR;  Service: Urology    WI LAPAROSCOPIC APPENDECTOMY N/A 04/01/2018    Procedure: APPENDECTOMY LAPAROSCOPIC;  Surgeon: Idania  MD Zachery;  Location:  MAIN OR;  Service: General      Family History   Problem Relation Age of Onset    Diabetes Family     Prostate cancer Family     Colon polyps Family     Ulcers Brother     Colon cancer Neg Hx       Social History     Tobacco Use    Smoking status: Never     Passive exposure: Never    Smokeless tobacco: Never   Vaping Use    Vaping status: Never Used   Substance Use Topics    Alcohol use: No     Comment:  no alcohol for at least 1 year ( January, 2021)    Drug use: No      E-Cigarette/Vaping    E-Cigarette Use Never User       E-Cigarette/Vaping Substances      I have reviewed and agree with the history as documented.     39M presents for non-bloody non-bilious vomiting and diarrhea beginiing this AM. States 4 episodes of vomiting with numerous episodes of loose stool since this AM. [place] well yesterday. Patient with associated abdominal cramping and back discomfort. Denies travel, sick contacts or questionable food ingestions. Denies trouble with urination    Hx of of bladder sx and appy.      Abdominal Pain      Review of Systems   Gastrointestinal:  Positive for abdominal pain.           Objective       ED Triage Vitals   Temperature Pulse Blood Pressure Respirations SpO2 Patient Position - Orthostatic VS   10/28/24 0635 10/28/24 0635 10/28/24 0635 10/28/24 0635 10/28/24 0635 10/28/24 0635   97.9 °F (36.6 °C) 92 121/75 20 100 % Lying      Temp Source Heart Rate Source BP Location FiO2 (%) Pain Score    10/28/24 0635 10/28/24 0635 10/28/24 0635 -- 10/28/24 0653    Temporal Monitor Right arm  8      Vitals      Date and Time Temp Pulse SpO2 Resp BP Pain Score FACES Pain Rating User   10/28/24 0730 -- 89 97 % 16 121/69 2 -- EC   10/28/24 0715 -- 84 95 % -- -- -- -- EC   10/28/24 0700 -- 81 95 % 16 112/61 -- -- EC   10/28/24 0653 -- -- -- -- -- 8 -- EC   10/28/24 0635 97.9 °F (36.6 °C) 92 100 % 20 121/75 -- -- NMB            Physical Exam  Vitals and nursing note reviewed.   Constitutional:        General: He is not in acute distress.     Appearance: He is well-developed. He is ill-appearing.   HENT:      Head: Normocephalic and atraumatic.      Right Ear: External ear normal.      Left Ear: External ear normal.      Mouth/Throat:      Mouth: Mucous membranes are dry.      Pharynx: No oropharyngeal exudate.   Eyes:      General: No scleral icterus.     Pupils: Pupils are equal, round, and reactive to light.   Cardiovascular:      Rate and Rhythm: Normal rate and regular rhythm.      Heart sounds: Normal heart sounds.   Pulmonary:      Effort: Pulmonary effort is normal. No respiratory distress.      Breath sounds: Normal breath sounds.   Abdominal:      General: Bowel sounds are normal. There is no distension.      Palpations: Abdomen is soft.      Tenderness: There is abdominal tenderness. There is no left CVA tenderness, guarding or rebound.   Musculoskeletal:         General: Normal range of motion.      Cervical back: Normal range of motion and neck supple.   Skin:     General: Skin is warm and dry.      Findings: No rash.   Neurological:      Mental Status: He is alert and oriented to person, place, and time.         Results Reviewed       Procedure Component Value Units Date/Time    Comprehensive metabolic panel [[id number]]  (Abnormal) Collected: 10/28/24 0642    Lab Status: Final result Specimen: Blood from Arm, Left Updated: 10/28/24 0712     Sodium 140 mmol/L      Potassium 3.2 mmol/L      Chloride 103 mmol/L      CO2 27 mmol/L      ANION GAP 10 mmol/L      BUN 12 mg/dL      Creatinine 0.86 mg/dL      Glucose 134 mg/dL      Calcium 9.9 mg/dL      AST 10 U/L      ALT 11 U/L      Alkaline Phosphatase 60 U/L      Total Protein 8.0 g/dL      Albumin 5.1 g/dL      Total Bilirubin 1.75 mg/dL      eGFR 109 ml/min/1.73sq m     Narrative:      National Kidney Disease Foundation guidelines for Chronic Kidney Disease (CKD):     Stage 1 with normal or high GFR (GFR > 90 mL/min/1.73 square meters)    Stage 2  Mild CKD (GFR = 60-89 mL/min/1.73 square meters)    Stage 3A Moderate CKD (GFR = 45-59 mL/min/1.73 square meters)    Stage 3B Moderate CKD (GFR = 30-44 mL/min/1.73 square meters)    Stage 4 Severe CKD (GFR = 15-29 mL/min/1.73 square meters)    Stage 5 End Stage CKD (GFR <15 mL/min/1.73 square meters)  Note: GFR calculation is accurate only with a steady state creatinine    Lipase [[id number]]  (Normal) Collected: 10/28/24 0642    Lab Status: Final result Specimen: Blood from Arm, Left Updated: 10/28/24 0712     Lipase 22 u/L     Magnesium [[id number]]  (Abnormal) Collected: 10/28/24 0642    Lab Status: Final result Specimen: Blood from Arm, Left Updated: 10/28/24 0712     Magnesium 1.6 mg/dL     Lactic acid, plasma (w/reflex if result > 2.0) [[id number]]  (Normal) Collected: 10/28/24 0642    Lab Status: Final result Specimen: Blood from Arm, Left Updated: 10/28/24 0712     LACTIC ACID 2.0 mmol/L     Narrative:      Result may be elevated if tourniquet was used during collection.    CBC and differential [[id number]]  (Abnormal) Collected: 10/28/24 0642    Lab Status: Final result Specimen: Blood from Arm, Left Updated: 10/28/24 0658     WBC 17.09 Thousand/uL      RBC 5.52 Million/uL      Hemoglobin 17.3 g/dL      Hematocrit 48.9 %      MCV 89 fL      MCH 31.3 pg      MCHC 35.4 g/dL      RDW 11.2 %      MPV 12.0 fL      Platelets 176 Thousands/uL      nRBC 0 /100 WBCs      Segmented % 87 %      Immature Grans % 1 %      Lymphocytes % 4 %      Monocytes % 8 %      Eosinophils Relative 0 %      Basophils Relative 0 %      Absolute Neutrophils 15.00 Thousands/µL      Absolute Immature Grans 0.13 Thousand/uL      Absolute Lymphocytes 0.60 Thousands/µL      Absolute Monocytes 1.32 Thousand/µL      Eosinophils Absolute 0.02 Thousand/µL      Basophils Absolute 0.02 Thousands/µL             CT abdomen pelvis with contrast   Final Interpretation by Byron Johns MD (10/28 0756)   1. No acute process seen.   2. Stable  spondylosis with severe bilateral foraminal stenosis at L5-S1 due to pars defects, grade 1 anterolisthesis, and disc bulge.         Workstation performed: [id number]             Procedures    ED Medication and Procedure Management   Prior to Admission Medications   Prescriptions Last Dose Informant Patient Reported? Taking?   dicyclomine (BENTYL) 10 mg capsule  Self No No   Sig: Take 1 capsule (10 mg total) by mouth every 6 (six) hours as needed (abdominal pain/cramping)   Patient not taking: Reported on 10/18/2023   ibuprofen (MOTRIN) 200 mg tablet   Yes No   Sig: Take by mouth every 6 (six) hours as needed for mild pain   Patient not taking: Reported on 8/20/2024      Facility-Administered Medications: None     Patient's Medications   Discharge Prescriptions    ONDANSETRON (ZOFRAN-ODT) 4 MG DISINTEGRATING TABLET    Take 1 tablet (4 mg total) by mouth every 6 (six) hours as needed for nausea or vomiting       Start Date: 10/28/2024End Date: --       Order Dose: 4 mg       Quantity: 15 tablet    Refills: 0     No discharge procedures on file.  ED SEPSIS DOCUMENTATION   Time reflects when diagnosis was documented in both MDM as applicable and the Disposition within this note       Time User Action Codes Description Comment    10/28/2024  8:44 AM Douglas Vegas [K52.9] Acute gastroenteritis     10/28/2024  8:50 AM Douglas Vegas [E83.42] Hypomagnesemia     10/28/2024  8:50 AM Douglas Vegas [E87.6] Hypokalemia due to excessive gastrointestinal loss of potassium                  Douglas Vegas,   10/28/24 0854

## 2025-02-27 ENCOUNTER — PROCEDURE VISIT (OUTPATIENT)
Dept: UROLOGY | Facility: CLINIC | Age: 40
End: 2025-02-27
Payer: COMMERCIAL

## 2025-02-27 VITALS
SYSTOLIC BLOOD PRESSURE: 148 MMHG | OXYGEN SATURATION: 98 % | BODY MASS INDEX: 20.7 KG/M2 | HEART RATE: 114 BPM | HEIGHT: 76 IN | WEIGHT: 170 LBS | DIASTOLIC BLOOD PRESSURE: 88 MMHG

## 2025-02-27 DIAGNOSIS — N32.89 MASS OF URINARY BLADDER: ICD-10-CM

## 2025-02-27 DIAGNOSIS — D30.3: Primary | ICD-10-CM

## 2025-02-27 LAB
SL AMB  POCT GLUCOSE, UA: NORMAL
SL AMB LEUKOCYTE ESTERASE,UA: NORMAL
SL AMB POCT BILIRUBIN,UA: NORMAL
SL AMB POCT BLOOD,UA: NORMAL
SL AMB POCT CLARITY,UA: CLEAR
SL AMB POCT COLOR,UA: YELLOW
SL AMB POCT KETONES,UA: NORMAL
SL AMB POCT NITRITE,UA: NORMAL
SL AMB POCT PH,UA: 6.5
SL AMB POCT SPECIFIC GRAVITY,UA: 1
SL AMB POCT URINE PROTEIN: NORMAL
SL AMB POCT UROBILINOGEN: 0.2

## 2025-02-27 PROCEDURE — 81002 URINALYSIS NONAUTO W/O SCOPE: CPT | Performed by: UROLOGY

## 2025-02-27 PROCEDURE — 52000 CYSTOURETHROSCOPY: CPT | Performed by: UROLOGY

## 2025-02-27 RX ORDER — MULTIVIT WITH MINERALS/LUTEIN
250 TABLET ORAL AS NEEDED
COMMUNITY

## 2025-02-27 NOTE — PROGRESS NOTES
Patient with history of benign papilloma identified incidentally, resected 8/18/2024.  No current complaints.       Cystoscopy     Date/Time  2/27/2025 8:00 AM     Performed by  Marquise Lozano MD   Authorized by  Marquise Lozano MD         Procedure Details:  Procedure type: cystoscopy    Additional Procedure Details: Patient was prepped with chlorhexidine and lidocaine jelly.  Flexible cystoscope was placed.  No urethral abnormality.  Prostate is nonobstructing.  Ureteral orifices are normal.  Mucosa is smooth without trabeculation.  No suspicious mass or lesion noted throughout the bladder.  On retroflexion, scar is seen adjacent to the bladder neck without any abnormality at that location.    Plan  Patient was reassured no abnormality identified.  May follow-up in 1 year.  Will order ultrasound.  No routine cystoscopy unless symptomatic or abnormal ultrasound finding.

## 2025-06-18 ENCOUNTER — NURSE TRIAGE (OUTPATIENT)
Age: 40
End: 2025-06-18

## 2025-06-18 DIAGNOSIS — R19.7 DIARRHEA, UNSPECIFIED TYPE: Primary | ICD-10-CM

## 2025-06-18 NOTE — TELEPHONE ENCOUNTER
"REASON FOR CONVERSATION: Diarrhea    SYMPTOMS: diarrhea, moving bowels 4-5 times a day, yellow liquiod watery stool, abdominal pain and cramping, started 1.5 months ago, getting worse, not always has urgency with meals but can move bowels within 20 min after a meal     OTHER HEALTH INFORMATION: hx of IBS, no IBD, also has hx of SIBO, pt. Feels this is a pancreatic issue and is asking for stool testing to find out if he has pancreatic insufficiency, advised that other stool studies were ordered per protocol    PROTOCOL DISPOSITION: 72 Hour Provider Response    CARE ADVICE PROVIDED: pt. Advised to treat diarrhea with imodium up to 8 tabs a day, stay hydrated, get stool studies done, provider will be made aware     PRACTICE FOLLOW-UP: pt. Asking for OV but nothing available to be schedule with Dr. Matthews, please advise further testing or if pt. Needs f/u               Reason for Disposition   Moderate illness: watery diarrhea>5 episodes, able to function but forced to change daily activities    Answer Assessment - Initial Assessment  1. When did this start and how frequent are your bowel movements? (when/number/day, hour)   1.5 months ago, getting worse, moving bowels 4 or more times a day  2. What is the consistency of your bowel movements?   Yellow liquid stool   3. Is there any blood/pus in your stools? (Yes - how much (drops, clots, profuse)   Denies   4. What is normal for you?   Formed stool twice daily   5. Is there an urgency with meals?   A few times but not always   6. ABDOMINAL PAIN: \"Are you having any abdominal pain?\" If yes, ask: \"What does it feel like?\" (e.g., crampy, dull, intermittent, constant)   Yes, cramping, pain improves after moving bowels   ABDOMINAL PAIN SEVERITY: If present, ask: \"How bad is the pain?\"  (e.g., Scale 1-10; mild, moderate, or severe)  Moderate to severe at times      - MILD (1-3): does not interfere with normal activities, abdomen soft and not tender to touch    - MODERATE " "(4-7): interferes with normal activities or awakens from sleep, tender to touch   - SEVERE (8-10): excruciating pain, doubled over, unable to do any normal activities    Do you have a fever? (Yes - what was it? Did it resolve with treatment (NSAIDs/acetaminophen)?   Denies   7. VOMITING: \"Are you also vomiting?\" If yes, ask: \"How many times in the past 24 hours?\"   Denies   8. Are you experiencing any associated symptoms including nausea, bloating, flatulence, tenesmus, or weight loss?   Nausea, fatigue  9. Are you able to tolerate anything by mouth and maintain your hydration? (Assess for dehydration by inquiring about dry/cracked lips, decreased urine output, lightheadedness, heart racing, etc.)   Eating and drinking ok   10. Are you currently taking anything for current symptoms? (Loperamide, oral fluids)   Pt. Has not been treating diarrhea with any otc meds   11. Do you have any history of C. diff infection (Clostridioides difficile), recent antibiotics, hospitalization, sick contacts, suspicious oral intake, recent travel, GI surgery, PPI (proton pump inhibitor) use, chemotherapy, enteral feeding   Denies   12. Do you have any history of other conditions such as IBD (Irritable bowel disease), IBS-D (irritable bowel syndrome)? If yes, consider alternative protocol.  IBS - colonoscopy did not show IBD   13. Have you recently had any stool studies (including fecal calprotectin), bloodwork such as a complete blood count, complete metabolic profile, c-reactive protein, erythrocyte sedimentation rate (CBC/CMP/CRP/ESR), imaging (ex: CT (Computed Tomography) scan), or procedures (ex: colonoscopy)? no    Protocols used: GI-Acute Diarrheal Infections-ADULT-[place]    "

## 2025-06-18 NOTE — TELEPHONE ENCOUNTER
I called and spoke to the patient and scheduled an office visit with Dr Matthews for 6/19/25 at 2 pm. The patient was also informed of the lab orders that Dr Matthews placed but will need the orders printed at the office visit so he can take to [facility].

## 2025-06-19 ENCOUNTER — OFFICE VISIT (OUTPATIENT)
Dept: GASTROENTEROLOGY | Facility: CLINIC | Age: 40
End: 2025-06-19
Payer: COMMERCIAL

## 2025-06-19 VITALS
WEIGHT: 191 LBS | HEIGHT: 76 IN | DIASTOLIC BLOOD PRESSURE: 76 MMHG | SYSTOLIC BLOOD PRESSURE: 118 MMHG | BODY MASS INDEX: 23.26 KG/M2

## 2025-06-19 DIAGNOSIS — K52.9 CHRONIC DIARRHEA: ICD-10-CM

## 2025-06-19 DIAGNOSIS — R10.9 ABDOMINAL CRAMPING: Primary | ICD-10-CM

## 2025-06-19 PROCEDURE — 99214 OFFICE O/P EST MOD 30 MIN: CPT | Performed by: STUDENT IN AN ORGANIZED HEALTH CARE EDUCATION/TRAINING PROGRAM

## 2025-06-19 RX ORDER — DICYCLOMINE HCL 20 MG
20 TABLET ORAL EVERY 6 HOURS
Qty: 120 TABLET | Refills: 0 | Status: SHIPPED | OUTPATIENT
Start: 2025-06-19 | End: 2025-07-19

## 2025-06-19 NOTE — PROGRESS NOTES
"Name: Lanre Castro      : 1985      MRN: [id number]  Encounter Provider: Dario Matthews DO  Encounter Date: 2025   Encounter department: [facility]  :  Assessment & Plan  Abdominal cramping  Given chronicity, suspect IBS-D.  Other considerations include constipation with overflow, malabsorptive diarrhea.  No nighttime symptoms to suggest secretory diarrhea, no inflammatory symptoms.    Obtain KUB to assess colonic stool burden.    Stool studies were ordered yesterday via phone call including calprotectin, C. difficile, leukocytes, O&P, enteric panel.  Add on fecal fat and elastase.    Start Bentyl 20 mg every 6 hours.    If no improvement and stool studies unremarkable will give trial of rifaximin for 2 weeks.    If ongoing symptoms and no improvement and no clear finding on noninvasive workup, will proceed with EGD colonoscopy.  Orders:    XR abdomen 1 view kub; Future    dicyclomine (BENTYL) 20 mg tablet; Take 1 tablet (20 mg total) by mouth every 6 (six) hours    Chronic diarrhea  As above, stool studies, trial of Bentyl.  If no response trial of rifaximin.  May need eventual EGD/colonoscopy.  Orders:    Fecal fat, qualitative; Future      Assessment & Plan    History of Present Illness   History of Present Illness  Lanre is a pleasant 40-year-old male with past medical history chronic GI issues including SIBO, abdominal cramping and bloating.  These issues have been going on for several years.  He was last seen in the office in 2024.    Symptoms have recently worsened with change in the weather.  He works outside in construction and this may be part of the cause.  He will go to sit down in his car after he is done with work and start to have a sensation of his \" stomach dropping\" into his lower abdomen and have to run to the bathroom and have a few episodes of diarrhea.  The diarrhea is oily.  There is no blood.  No fevers or " "chills.  No sick contacts.    He has not been on any medications for this specifically.  He thinks he was on Bentyl a few years ago which did help.    Last colonoscopy in 2021, biopsies taken for microscopic colitis which were negative, advised to repeat colonoscopy at age 50.    No night time symptoms.    History obtained from: patient  Review of Systems A complete review of systems is negative other than that noted above in the HPI.    Medications Ordered Prior to Encounter[1]   Social History[2]  Current Medications[3]  Objective   /76   Ht 6' 4\" (1.93 m)   Wt 86.6 kg (191 lb)   BMI 23.25 kg/m²       Physical Exam  General Appearance: NAD, cooperative, alert  Eyes: Anicteric  GI:  Soft, non-tender, non-distended; normal bowel sounds; no masses, no organomegaly   Rectal: Deferred  Musculoskeletal: No edema.  Skin:     No jaundice      Results    Lab Results: I personally reviewed relevant lab results.       Results for orders placed during the hospital encounter of 02/24/21    Colonoscopy    Impression  Normal exam including views of the ileum, no signs of inflammatory bowel disease  Biopsies obtained to assess for microscopic colitis    RECOMMENDATION:  Endoscopist will call with biopsy results within 2 weeks , office follow-up pending results    Edmund Quarles, DO      Administrative Statements   I have spent a total time of 30 minutes in caring for this patient on the day of the visit/encounter including Diagnostic results, Prognosis, Risks and benefits of tx options, Instructions for management, Patient and family education, Importance of tx compliance, and Obtaining or reviewing history  .         [1]   Current Outpatient Medications on File Prior to Visit   Medication Sig Dispense Refill    ascorbic acid (VITAMIN C) 250 mg tablet Take 250 mg by mouth if needed      ibuprofen (MOTRIN) 200 mg tablet Take by mouth every 6 (six) hours as needed for mild pain (Patient not taking: Reported on 8/20/2024)   "    ondansetron (ZOFRAN-ODT) 4 mg disintegrating tablet Take 1 tablet (4 mg total) by mouth every 6 (six) hours as needed for nausea or vomiting (Patient not taking: Reported on 2/27/2025) 15 tablet 0    [DISCONTINUED] dicyclomine (BENTYL) 10 mg capsule Take 1 capsule (10 mg total) by mouth every 6 (six) hours as needed (abdominal pain/cramping) (Patient not taking: Reported on 10/18/2023) 90 capsule 1     No current facility-administered medications on file prior to visit.   [2]   Social History  Tobacco Use    Smoking status: Never     Passive exposure: Never    Smokeless tobacco: Never   Vaping Use    Vaping status: Never Used   Substance and Sexual Activity    Alcohol use: No     Comment:  no alcohol for at least 1 year ( January, 2021)    Drug use: No   [3]   Current Outpatient Medications   Medication Sig Dispense Refill    ascorbic acid (VITAMIN C) 250 mg tablet Take 250 mg by mouth if needed      dicyclomine (BENTYL) 10 mg capsule Take 1 capsule (10 mg total) by mouth every 6 (six) hours as needed (abdominal pain/cramping) (Patient not taking: Reported on 10/18/2023) 90 capsule 1    ibuprofen (MOTRIN) 200 mg tablet Take by mouth every 6 (six) hours as needed for mild pain (Patient not taking: Reported on 8/20/2024)      ondansetron (ZOFRAN-ODT) 4 mg disintegrating tablet Take 1 tablet (4 mg total) by mouth every 6 (six) hours as needed for nausea or vomiting (Patient not taking: Reported on 2/27/2025) 15 tablet 0     No current facility-administered medications for this visit.

## 2025-06-28 LAB
C DIFF TOX GENS STL QL NAA+PROBE: NEGATIVE
CALPROTECTIN STL-MCNT: 11 UG/G (ref 0–120)
ELASTASE PANC STL-MCNT: >800 UG ELAST./G
FAT STL QL: NORMAL
NEUTRAL FAT STL QL: NORMAL

## (undated) DEVICE — 5 MM CURVED DISSECTORS WITH MONOPOLAR CAUTERY: Brand: ENDOPATH

## (undated) DEVICE — TUBING SMOKE EVAC W/FILTRATION DEVICE PLUMEPORT ACTIV

## (undated) DEVICE — ALLENTOWN LAP CHOLE APP PACK: Brand: CARDINAL HEALTH

## (undated) DEVICE — PACK TUR

## (undated) DEVICE — INTENDED FOR TISSUE SEPARATION, AND OTHER PROCEDURES THAT REQUIRE A SHARP SURGICAL BLADE TO PUNCTURE OR CUT.: Brand: BARD-PARKER SAFETY BLADES SIZE 15, STERILE

## (undated) DEVICE — INVIEW CLEAR LEGGINGS: Brand: CONVERTORS

## (undated) DEVICE — 2000CC GUARDIAN II: Brand: GUARDIAN

## (undated) DEVICE — ENDOPATH ETS45 2.5MM RELOADS (VASCULAR/THIN): Brand: ENDOPATH

## (undated) DEVICE — SUT MONOCRYL 4-0 PS-2 27 IN Y426H

## (undated) DEVICE — 3M™ STERI-STRIP™ REINFORCED ADHESIVE SKIN CLOSURES, R1547, 1/2 IN X 4 IN (12 MM X 100 MM), 6 STRIPS/ENVELOPE: Brand: 3M™ STERI-STRIP™

## (undated) DEVICE — ENDOPATH XCEL UNIVERSAL TROCAR STABLILITY SLEEVES: Brand: ENDOPATH XCEL

## (undated) DEVICE — GLOVE INDICATOR PI UNDERGLOVE SZ 6.5 BLUE

## (undated) DEVICE — TRAY FOLEY 16FR URIMETER SURESTEP

## (undated) DEVICE — GLOVE SRG BIOGEL 6.5

## (undated) DEVICE — BLUE HEAT SCOPE WARMER

## (undated) DEVICE — UROLOGIC DRAIN BAG: Brand: UNBRANDED

## (undated) DEVICE — HARMONIC ACE 5MM DIAMETER SHEARS 36CM SHAFT LENGTH + ADAPTIVE TISSUE TECHNOLOGY FOR USE WITH GENERATOR G11: Brand: HARMONIC ACE

## (undated) DEVICE — SPECIMEN CONTAINER STERILE PEEL PACK

## (undated) DEVICE — STERILE LUBRICATING JELLY, TUBE: Brand: HR LUBRICATING JELLY

## (undated) DEVICE — PREMIUM DRY TRAY LF: Brand: MEDLINE INDUSTRIES, INC.

## (undated) DEVICE — SCD SEQUENTIAL COMPRESSION COMFORT SLEEVE MEDIUM KNEE LENGTH: Brand: KENDALL SCD

## (undated) DEVICE — DRESSING TELFA 2 X 3 IN STRL

## (undated) DEVICE — GLOVE INDICATOR PI UNDERGLOVE SZ 7 BLUE

## (undated) DEVICE — CHLORHEXIDINE 4PCT 4 OZ

## (undated) DEVICE — CHLORAPREP HI-LITE 26ML ORANGE

## (undated) DEVICE — PMI DISPOSABLE PUNCTURE CLOSURE DEVICE / SUTURE GRASPER: Brand: PMI

## (undated) DEVICE — ENDOPATH XCEL BLADELESS TROCARS WITH STABILITY SLEEVES: Brand: ENDOPATH XCEL

## (undated) DEVICE — NEEDLE 25G X 1 1/2

## (undated) DEVICE — CATH FOLEY 18FR 5ML 2 WAY SILICONE ELASTIMER

## (undated) DEVICE — ENDOPOUCH RETRIEVER SPECIMEN RETRIEVAL BAGS: Brand: ENDOPOUCH RETRIEVER

## (undated) DEVICE — REM POLYHESIVE ADULT PATIENT RETURN ELECTRODE: Brand: VALLEYLAB

## (undated) DEVICE — ETS45 RELOAD STANDARD 45MM: Brand: ENDOPATH

## (undated) DEVICE — GLOVE SRG BIOGEL ORTHOPEDIC 7.5

## (undated) DEVICE — 3M™ STERI-STRIP™ COMPOUND BENZOIN TINCTURE 40 BAGS/CARTON 4 CARTONS/CASE C1544: Brand: 3M™ STERI-STRIP™

## (undated) DEVICE — ENDOPATH ETS-FLEX45 ARTICULATING ENDOSCOPIC LINEAR CUTTER, NO RELOAD: Brand: ENDOPATH